# Patient Record
Sex: FEMALE | Employment: UNEMPLOYED | URBAN - METROPOLITAN AREA
[De-identification: names, ages, dates, MRNs, and addresses within clinical notes are randomized per-mention and may not be internally consistent; named-entity substitution may affect disease eponyms.]

---

## 2021-09-05 ENCOUNTER — HOSPITAL ENCOUNTER (INPATIENT)
Age: 86
LOS: 8 days | Discharge: NURSING, REHAB OR LONG-TERM CARE | DRG: 391 | End: 2021-09-13
Attending: EMERGENCY MEDICINE | Admitting: INTERNAL MEDICINE
Payer: MEDICARE

## 2021-09-05 ENCOUNTER — APPOINTMENT (OUTPATIENT)
Dept: CT IMAGING | Age: 86
DRG: 391 | End: 2021-09-05
Payer: MEDICARE

## 2021-09-05 DIAGNOSIS — K57.32 DIVERTICULITIS OF COLON: ICD-10-CM

## 2021-09-05 DIAGNOSIS — R90.0 INTRACRANIAL SPACE-OCCUPYING LESION: ICD-10-CM

## 2021-09-05 DIAGNOSIS — R42 DIZZINESS: Primary | ICD-10-CM

## 2021-09-05 PROBLEM — R11.2 NAUSEA AND VOMITING: Status: ACTIVE | Noted: 2021-09-05

## 2021-09-05 PROBLEM — R93.0 ABNORMAL CT OF THE HEAD: Status: ACTIVE | Noted: 2021-09-05

## 2021-09-05 PROBLEM — E78.5 HLD (HYPERLIPIDEMIA): Status: ACTIVE | Noted: 2021-09-05

## 2021-09-05 PROBLEM — R19.7 DIARRHEA: Status: ACTIVE | Noted: 2021-09-05

## 2021-09-05 PROBLEM — K57.92 DIVERTICULITIS: Status: ACTIVE | Noted: 2021-09-05
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
## 2021-09-05 PROBLEM — N32.81 OAB (OVERACTIVE BLADDER): Status: ACTIVE | Noted: 2021-09-05

## 2021-09-05 LAB
ALBUMIN SERPL-MCNC: 4 G/DL (ref 3.5–5.2)
ALP BLD-CCNC: 91 U/L (ref 35–104)
ALT SERPL-CCNC: 14 U/L (ref 0–32)
ANION GAP SERPL CALCULATED.3IONS-SCNC: 15 MMOL/L (ref 7–16)
AST SERPL-CCNC: 27 U/L (ref 0–31)
BACTERIA: NORMAL /HPF
BASOPHILS ABSOLUTE: 0.03 E9/L (ref 0–0.2)
BASOPHILS RELATIVE PERCENT: 0.3 % (ref 0–2)
BILIRUB SERPL-MCNC: 0.8 MG/DL (ref 0–1.2)
BILIRUBIN URINE: NEGATIVE
BLOOD, URINE: ABNORMAL
BUN BLDV-MCNC: 16 MG/DL (ref 6–23)
CALCIUM SERPL-MCNC: 9 MG/DL (ref 8.6–10.2)
CHLORIDE BLD-SCNC: 99 MMOL/L (ref 98–107)
CLARITY: CLEAR
CO2: 21 MMOL/L (ref 22–29)
COLOR: YELLOW
CREAT SERPL-MCNC: 0.7 MG/DL (ref 0.5–1)
EOSINOPHILS ABSOLUTE: 0 E9/L (ref 0.05–0.5)
EOSINOPHILS RELATIVE PERCENT: 0 % (ref 0–6)
GFR AFRICAN AMERICAN: >60
GFR NON-AFRICAN AMERICAN: >60 ML/MIN/1.73
GLUCOSE BLD-MCNC: 153 MG/DL (ref 74–99)
GLUCOSE URINE: NEGATIVE MG/DL
HCT VFR BLD CALC: 45.2 % (ref 34–48)
HEMOGLOBIN: 14.9 G/DL (ref 11.5–15.5)
IMMATURE GRANULOCYTES #: 0.03 E9/L
IMMATURE GRANULOCYTES %: 0.3 % (ref 0–5)
KETONES, URINE: 15 MG/DL
LEUKOCYTE ESTERASE, URINE: NEGATIVE
LYMPHOCYTES ABSOLUTE: 1.41 E9/L (ref 1.5–4)
LYMPHOCYTES RELATIVE PERCENT: 13.8 % (ref 20–42)
MCH RBC QN AUTO: 30.9 PG (ref 26–35)
MCHC RBC AUTO-ENTMCNC: 33 % (ref 32–34.5)
MCV RBC AUTO: 93.8 FL (ref 80–99.9)
MONOCYTES ABSOLUTE: 0.31 E9/L (ref 0.1–0.95)
MONOCYTES RELATIVE PERCENT: 3 % (ref 2–12)
NEUTROPHILS ABSOLUTE: 8.44 E9/L (ref 1.8–7.3)
NEUTROPHILS RELATIVE PERCENT: 82.6 % (ref 43–80)
NITRITE, URINE: NEGATIVE
PDW BLD-RTO: 11.9 FL (ref 11.5–15)
PH UA: 7 (ref 5–9)
PLATELET # BLD: 243 E9/L (ref 130–450)
PMV BLD AUTO: 9.8 FL (ref 7–12)
POTASSIUM REFLEX MAGNESIUM: 4.8 MMOL/L (ref 3.5–5)
PROTEIN UA: NEGATIVE MG/DL
RBC # BLD: 4.82 E12/L (ref 3.5–5.5)
RBC UA: NORMAL /HPF (ref 0–2)
SODIUM BLD-SCNC: 135 MMOL/L (ref 132–146)
SPECIFIC GRAVITY UA: 1.02 (ref 1–1.03)
TOTAL CK: 54 U/L (ref 20–180)
TOTAL PROTEIN: 7.6 G/DL (ref 6.4–8.3)
TROPONIN, HIGH SENSITIVITY: 9 NG/L (ref 0–9)
UROBILINOGEN, URINE: 0.2 E.U./DL
WBC # BLD: 10.2 E9/L (ref 4.5–11.5)
WBC UA: NORMAL /HPF (ref 0–5)

## 2021-09-05 PROCEDURE — 2060000000 HC ICU INTERMEDIATE R&B

## 2021-09-05 PROCEDURE — 6360000004 HC RX CONTRAST MEDICATION: Performed by: RADIOLOGY

## 2021-09-05 PROCEDURE — 74177 CT ABD & PELVIS W/CONTRAST: CPT

## 2021-09-05 PROCEDURE — 96374 THER/PROPH/DIAG INJ IV PUSH: CPT

## 2021-09-05 PROCEDURE — 72125 CT NECK SPINE W/O DYE: CPT

## 2021-09-05 PROCEDURE — 2580000003 HC RX 258: Performed by: RADIOLOGY

## 2021-09-05 PROCEDURE — 6360000002 HC RX W HCPCS: Performed by: EMERGENCY MEDICINE

## 2021-09-05 PROCEDURE — 81001 URINALYSIS AUTO W/SCOPE: CPT

## 2021-09-05 PROCEDURE — 80053 COMPREHEN METABOLIC PANEL: CPT

## 2021-09-05 PROCEDURE — 99285 EMERGENCY DEPT VISIT HI MDM: CPT

## 2021-09-05 PROCEDURE — 82550 ASSAY OF CK (CPK): CPT

## 2021-09-05 PROCEDURE — 85025 COMPLETE CBC W/AUTO DIFF WBC: CPT

## 2021-09-05 PROCEDURE — 71260 CT THORAX DX C+: CPT

## 2021-09-05 PROCEDURE — 93005 ELECTROCARDIOGRAM TRACING: CPT | Performed by: EMERGENCY MEDICINE

## 2021-09-05 PROCEDURE — 84484 ASSAY OF TROPONIN QUANT: CPT

## 2021-09-05 PROCEDURE — 70450 CT HEAD/BRAIN W/O DYE: CPT

## 2021-09-05 RX ORDER — FENTANYL CITRATE 50 UG/ML
25 INJECTION, SOLUTION INTRAMUSCULAR; INTRAVENOUS ONCE
Status: DISCONTINUED | OUTPATIENT
Start: 2021-09-05 | End: 2021-09-13 | Stop reason: HOSPADM

## 2021-09-05 RX ORDER — 0.9 % SODIUM CHLORIDE 0.9 %
1000 INTRAVENOUS SOLUTION INTRAVENOUS ONCE
Status: DISCONTINUED | OUTPATIENT
Start: 2021-09-05 | End: 2021-09-13 | Stop reason: HOSPADM

## 2021-09-05 RX ORDER — ONDANSETRON 2 MG/ML
4 INJECTION INTRAMUSCULAR; INTRAVENOUS ONCE
Status: COMPLETED | OUTPATIENT
Start: 2021-09-05 | End: 2021-09-05

## 2021-09-05 RX ORDER — SODIUM CHLORIDE 0.9 % (FLUSH) 0.9 %
10 SYRINGE (ML) INJECTION
Status: COMPLETED | OUTPATIENT
Start: 2021-09-05 | End: 2021-09-05

## 2021-09-05 RX ADMIN — IOPAMIDOL 90 ML: 755 INJECTION, SOLUTION INTRAVENOUS at 20:25

## 2021-09-05 RX ADMIN — ONDANSETRON 4 MG: 2 INJECTION INTRAMUSCULAR; INTRAVENOUS at 18:45

## 2021-09-05 RX ADMIN — Medication 10 ML: at 20:26

## 2021-09-05 ASSESSMENT — ENCOUNTER SYMPTOMS
NAUSEA: 1
ABDOMINAL PAIN: 1
SHORTNESS OF BREATH: 0
VOMITING: 1
EYE REDNESS: 0
DIARRHEA: 1

## 2021-09-05 NOTE — ED PROVIDER NOTES
Chief complaint: Dizziness and fall      HPI:  9/5/21, Time: 5:27 PM EDT    HPI             Deysi Delgado is a 80 y.o. female presenting to the ED for dizziness and fall. The history is obtained from the patient as well as the patient's medical record. The patient is presenting today for dizziness and fall. The patient states that yesterday she began a statin. She does not recall the exact name. She states that she did take her medication today she woke up with dizziness, nausea, vomiting, diarrhea and experience a fall. Her nausea and vomiting has been mild in severity. Not makes better. Nothing is worse. No hematemesis or coffee-ground emesis. The patient states that she has had dizziness she describes as lightheaded. Patient states that she was having a bowel movement went to stand up and fell in her bathroom. She not have a syncopal episode. She states that she did have pain located in her left lower lateral ribs. Pain described as sharp, worse with palpation. The patient is non radiating  Nothing makes better. No treatment prior to arrival.  She denies any fevers, chest pain, shortness of breath. ROS:   Review of Systems   Constitutional: Negative for chills and fatigue. HENT: Negative for congestion. Eyes: Negative for redness. Respiratory: Negative for shortness of breath. Cardiovascular: Negative for chest pain. Gastrointestinal: Positive for abdominal pain, diarrhea, nausea and vomiting. Genitourinary: Negative for dysuria. Musculoskeletal: Negative for arthralgias. Skin: Negative for rash. Neurological: Negative for light-headedness. Psychiatric/Behavioral: Negative for confusion.    All other systems reviewed and are negative.      --------------------------------------------- PAST HISTORY ---------------------------------------------  Past Medical History:  has a past medical history of Age-related osteoporosis without current pathological fracture, Carpal tunnel syndrome, Gastro-esophageal reflux disease without esophagitis, and Hyperlipidemia. Past Surgical History:  has a past surgical history that includes Total hip arthroplasty (2020). Social History:  reports that she has never smoked. She has never used smokeless tobacco. She reports that she does not use drugs. Family History: family history is not on file. The patients home medications have been reviewed. Allergies: Celecoxib, Pregabalin, and Risedronate sodium    ---------------------------------------------------PHYSICAL EXAM--------------------------------------  Constitutional/General: Alert and oriented x3, well appearing, non toxic in NAD  Head: Normocephalic and atraumatic  Mouth: Oropharynx clear, handling secretions, no trismus  Neck: Supple, full ROM,  Pulmonary: Lungs clear to auscultation bilaterally, no wheezes, rales, or rhonchi. Not in respiratory distress  Cardiovascular:  Regular rate. Regular rhythm. No murmurs  Chest: no chest wall tenderness  Abdomen: Soft. Non tender. Non distended. No rebound, guarding, or rigidity. No pulsatile masses appreciated. Musculoskeletal: Moves all extremities x 4. Warm and well perfused, no clubbing, cyanosis, or edema. Capillary refill <3 seconds  Skin: warm and dry. No rashes. Neurologic: GCS 15, no gross focal neurologic deficits  Psych: Normal Affect    -------------------------------------------------- RESULTS -------------------------------------------------  I have personally reviewed all laboratory and imaging results for this patient. Results are listed below.      LABS:  Results for orders placed or performed during the hospital encounter of 09/05/21   COVID-19, Rapid    Specimen: Nasopharyngeal Swab   Result Value Ref Range    SARS-CoV-2, NAAT Not Detected Not Detected   CBC Auto Differential   Result Value Ref Range    WBC 10.2 4.5 - 11.5 E9/L    RBC 4.82 3.50 - 5.50 E12/L    Hemoglobin 14.9 11.5 - 15.5 g/dL    Hematocrit 45.2 34.0 - 48.0 %    MCV 93.8 80.0 - 99.9 fL    MCH 30.9 26.0 - 35.0 pg    MCHC 33.0 32.0 - 34.5 %    RDW 11.9 11.5 - 15.0 fL    Platelets 251 944 - 437 E9/L    MPV 9.8 7.0 - 12.0 fL    Neutrophils % 82.6 (H) 43.0 - 80.0 %    Immature Granulocytes % 0.3 0.0 - 5.0 %    Lymphocytes % 13.8 (L) 20.0 - 42.0 %    Monocytes % 3.0 2.0 - 12.0 %    Eosinophils % 0.0 0.0 - 6.0 %    Basophils % 0.3 0.0 - 2.0 %    Neutrophils Absolute 8.44 (H) 1.80 - 7.30 E9/L    Immature Granulocytes # 0.03 E9/L    Lymphocytes Absolute 1.41 (L) 1.50 - 4.00 E9/L    Monocytes Absolute 0.31 0.10 - 0.95 E9/L    Eosinophils Absolute 0.00 (L) 0.05 - 0.50 E9/L    Basophils Absolute 0.03 0.00 - 0.20 E9/L   Comprehensive Metabolic Panel w/ Reflex to MG   Result Value Ref Range    Sodium 135 132 - 146 mmol/L    Potassium reflex Magnesium 4.8 3.5 - 5.0 mmol/L    Chloride 99 98 - 107 mmol/L    CO2 21 (L) 22 - 29 mmol/L    Anion Gap 15 7 - 16 mmol/L    Glucose 153 (H) 74 - 99 mg/dL    BUN 16 6 - 23 mg/dL    CREATININE 0.7 0.5 - 1.0 mg/dL    GFR Non-African American >60 >=60 mL/min/1.73    GFR African American >60     Calcium 9.0 8.6 - 10.2 mg/dL    Total Protein 7.6 6.4 - 8.3 g/dL    Albumin 4.0 3.5 - 5.2 g/dL    Total Bilirubin 0.8 0.0 - 1.2 mg/dL    Alkaline Phosphatase 91 35 - 104 U/L    ALT 14 0 - 32 U/L    AST 27 0 - 31 U/L   Troponin   Result Value Ref Range    Troponin, High Sensitivity 9 0 - 9 ng/L   Urinalysis   Result Value Ref Range    Color, UA Yellow Straw/Yellow    Clarity, UA Clear Clear    Glucose, Ur Negative Negative mg/dL    Bilirubin Urine Negative Negative    Ketones, Urine 15 (A) Negative mg/dL    Specific Gravity, UA 1.025 1.005 - 1.030    Blood, Urine TRACE (A) Negative    pH, UA 7.0 5.0 - 9.0    Protein, UA Negative Negative mg/dL    Urobilinogen, Urine 0.2 <2.0 E.U./dL    Nitrite, Urine Negative Negative    Leukocyte Esterase, Urine Negative Negative   CK   Result Value Ref Range    Total CK 54 20 - 180 U/L   Microscopic Urinalysis   Result Value Ref Range    WBC, UA NONE 0 - 5 /HPF    RBC, UA 0-1 0 - 2 /HPF    Bacteria, UA NONE SEEN None Seen /HPF   EKG 12 Lead   Result Value Ref Range    Ventricular Rate 69 BPM    Atrial Rate 69 BPM    P-R Interval 136 ms    QRS Duration 88 ms    Q-T Interval 422 ms    QTc Calculation (Bazett) 452 ms    P Axis 76 degrees    R Axis 52 degrees    T Axis 85 degrees       RADIOLOGY:  Interpreted by Radiologist.  CT HEAD WO CONTRAST   Final Result   Chronic appearing large cystic collection in the left frontotemporal region   with mass effect and midline shift as noted likely an arachnoid cyst or   subdural hygroma. If further imaging is clinically warranted, consider MRI. There is no acute stroke or hemorrhage. Atrophy with small vessel ischemic disease. CT cervical spine. There is no acute fracture or dislocation in the cervical spine. There is   diffuse degenerative changes from C2-T1 with osteophytes and multilevel disc   bulges. The prevertebral soft tissues are normal.  Degenerative pannus is   identified at the craniocervical junction. Impression      No acute fractures. Diffuse degenerative changes from C2-T1. CT chest.      The heart and the great vessels are normal.  There is no central pulmonary   embolism. Trachea and major bronchi are patent. There is minimal   atelectasis/ground-glass densities in the lower lobes bilaterally and   lingula. There is no focal consolidation or pneumothorax. Impression      Minimal atelectasis/ground-glass densities in the lower lobes and lingula. Viral infection has to be excluded. There is no acute traumatic injury or   pneumothorax. CT abdomen and pelvis. The liver is of normal architecture. Gallbladder, spleen, pancreas, the   adrenals and the kidneys are normal except for multiple cortical and   peripelvic cyst in the left kidney largest 1 measuring up to 2.2 cm.    Degenerative changes are identified in the lumbar spine.  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 Pelvis. Streak artifact from the left hip arthroplasty limits the evaluation of the   pelvis. Bladder is distended. There is diverticulosis of the descending and   sigmoid colon with mild thickening of the sigmoid colon which is partially   collapsed. Appendix cannot be identified. Impression      No acute traumatic injury or solid organ injury in the abdomen and pelvis. Diverticulosis of the left hemicolon with mild thickening of the rectosigmoid   colon likely spasm or less likely mild uncomplicated diverticulitis. CT CERVICAL SPINE WO CONTRAST   Final Result   Chronic appearing large cystic collection in the left frontotemporal region   with mass effect and midline shift as noted likely an arachnoid cyst or   subdural hygroma. If further imaging is clinically warranted, consider MRI. There is no acute stroke or hemorrhage. Atrophy with small vessel ischemic disease. CT cervical spine. There is no acute fracture or dislocation in the cervical spine. There is   diffuse degenerative changes from C2-T1 with osteophytes and multilevel disc   bulges. The prevertebral soft tissues are normal.  Degenerative pannus is   identified at the craniocervical junction. Impression      No acute fractures. Diffuse degenerative changes from C2-T1. CT chest.      The heart and the great vessels are normal.  There is no central pulmonary   embolism. Trachea and major bronchi are patent. There is minimal   atelectasis/ground-glass densities in the lower lobes bilaterally and   lingula. There is no focal consolidation or pneumothorax. Impression      Minimal atelectasis/ground-glass densities in the lower lobes and lingula. Viral infection has to be excluded. There is no acute traumatic injury or   pneumothorax. CT abdomen and pelvis.       The liver is of normal architecture. Gallbladder, spleen, pancreas, the   adrenals and the kidneys are normal except for multiple cortical and   peripelvic cyst in the left kidney largest 1 measuring up to 2.2 cm. Degenerative changes are identified in the lumbar spine. 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 Pelvis. Streak artifact from the left hip arthroplasty limits the evaluation of the   pelvis. Bladder is distended. There is diverticulosis of the descending and   sigmoid colon with mild thickening of the sigmoid colon which is partially   collapsed. Appendix cannot be identified. Impression      No acute traumatic injury or solid organ injury in the abdomen and pelvis. Diverticulosis of the left hemicolon with mild thickening of the rectosigmoid   colon likely spasm or less likely mild uncomplicated diverticulitis. CT CHEST W CONTRAST   Final Result   Chronic appearing large cystic collection in the left frontotemporal region   with mass effect and midline shift as noted likely an arachnoid cyst or   subdural hygroma. If further imaging is clinically warranted, consider MRI. There is no acute stroke or hemorrhage. Atrophy with small vessel ischemic disease. CT cervical spine. There is no acute fracture or dislocation in the cervical spine. There is   diffuse degenerative changes from C2-T1 with osteophytes and multilevel disc   bulges. The prevertebral soft tissues are normal.  Degenerative pannus is   identified at the craniocervical junction. Impression      No acute fractures. Diffuse degenerative changes from C2-T1. CT chest.      The heart and the great vessels are normal.  There is no central pulmonary   embolism. Trachea and major bronchi are patent. There is minimal   atelectasis/ground-glass densities in the lower lobes bilaterally and   lingula.   There is no focal consolidation or pneumothorax. Impression      Minimal atelectasis/ground-glass densities in the lower lobes and lingula. Viral infection has to be excluded. There is no acute traumatic injury or   pneumothorax. CT abdomen and pelvis. The liver is of normal architecture. Gallbladder, spleen, pancreas, the   adrenals and the kidneys are normal except for multiple cortical and   peripelvic cyst in the left kidney largest 1 measuring up to 2.2 cm. Degenerative changes are identified in the lumbar spine. 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 Pelvis. Streak artifact from the left hip arthroplasty limits the evaluation of the   pelvis. Bladder is distended. There is diverticulosis of the descending and   sigmoid colon with mild thickening of the sigmoid colon which is partially   collapsed. Appendix cannot be identified. Impression      No acute traumatic injury or solid organ injury in the abdomen and pelvis. Diverticulosis of the left hemicolon with mild thickening of the rectosigmoid   colon likely spasm or less likely mild uncomplicated diverticulitis. CT ABDOMEN PELVIS W IV CONTRAST Additional Contrast? None   Final Result   Chronic appearing large cystic collection in the left frontotemporal region   with mass effect and midline shift as noted likely an arachnoid cyst or   subdural hygroma. If further imaging is clinically warranted, consider MRI. There is no acute stroke or hemorrhage. Atrophy with small vessel ischemic disease. CT cervical spine. There is no acute fracture or dislocation in the cervical spine. There is   diffuse degenerative changes from C2-T1 with osteophytes and multilevel disc   bulges. The prevertebral soft tissues are normal.  Degenerative pannus is   identified at the craniocervical junction. Impression      No acute fractures.       Diffuse degenerative changes from C2-T1. CT chest.      The heart and the great vessels are normal.  There is no central pulmonary   embolism. Trachea and major bronchi are patent. There is minimal   atelectasis/ground-glass densities in the lower lobes bilaterally and   lingula. There is no focal consolidation or pneumothorax. Impression      Minimal atelectasis/ground-glass densities in the lower lobes and lingula. Viral infection has to be excluded. There is no acute traumatic injury or   pneumothorax. CT abdomen and pelvis. The liver is of normal architecture. Gallbladder, spleen, pancreas, the   adrenals and the kidneys are normal except for multiple cortical and   peripelvic cyst in the left kidney largest 1 measuring up to 2.2 cm. Degenerative changes are identified in the lumbar spine. 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 Pelvis. Streak artifact from the left hip arthroplasty limits the evaluation of the   pelvis. Bladder is distended. There is diverticulosis of the descending and   sigmoid colon with mild thickening of the sigmoid colon which is partially   collapsed. Appendix cannot be identified. Impression      No acute traumatic injury or solid organ injury in the abdomen and pelvis. Diverticulosis of the left hemicolon with mild thickening of the rectosigmoid   colon likely spasm or less likely mild uncomplicated diverticulitis. EKG:  This EKG is signed and interpreted by me. Normal sinus rhythm, rate of 69, no ST segment elevation or depression, [place] interval 136 MS, QRS 88 MS,  MS  Interpreted by me      ------------------------- NURSING NOTES AND VITALS REVIEWED ---------------------------   The nursing notes within the ED encounter and vital signs as below have been reviewed by myself.   BP (!) 178/98   Pulse 76   Temp 98.6 °F (37 °C) (Temporal)   Resp 18   SpO2 94%   Oxygen Saturation Interpretation: Normal    The patients available past medical records and past encounters were reviewed. ------------------------------ ED COURSE/MEDICAL DECISION MAKING----------------------  Medications   0.9 % sodium chloride bolus (has no administration in time range)   fentaNYL (SUBLIMAZE) injection 25 mcg (has no administration in time range)   sodium chloride flush 0.9 % injection 5-40 mL (has no administration in time range)   sodium chloride flush 0.9 % injection 5-40 mL (has no administration in time range)   0.9 % sodium chloride infusion (has no administration in time range)   enoxaparin (LOVENOX) injection 40 mg (40 mg SubCUTAneous Given 9/6/21 1337)   polyethylene glycol (GLYCOLAX) packet 17 g (has no administration in time range)   acetaminophen (TYLENOL) tablet 650 mg (has no administration in time range)     Or   acetaminophen (TYLENOL) suppository 650 mg (has no administration in time range)   prochlorperazine (COMPAZINE) injection 5 mg (has no administration in time range)   pravastatin (PRAVACHOL) tablet 20 mg (has no administration in time range)   metronidazole (FLAGYL) 500 mg in NaCl 100 mL IVPB premix (0 mg IntraVENous Stopped 9/6/21 1547)   cefTRIAXone (ROCEPHIN) 1,000 mg in sterile water 10 mL IV syringe (1,000 mg IntraVENous New Bag 9/6/21 1336)   hydrALAZINE (APRESOLINE) injection 5 mg (5 mg IntraVENous Given 9/6/21 1336)   fentaNYL (SUBLIMAZE) injection 25 mcg (has no administration in time range)   HYDROcodone-acetaminophen (NORCO) 5-325 MG per tablet 1 tablet (has no administration in time range)   ondansetron (ZOFRAN) injection 4 mg (4 mg IntraVENous Given 9/5/21 1845)   iopamidol (ISOVUE-370) 76 % injection 90 mL (90 mLs IntraVENous Given 9/5/21 2025)   sodium chloride flush 0.9 % injection 10 mL (10 mLs IntraVENous Given 9/5/21 2026)             Medical Decision Making:   IDr. Jose am the primary physician of record.     Kathrine Terry is a 80 y.o. female who presents to the ED for dizziness and fall. Differential diagnosis closed but not limited to intracranial hemorrhage, electrolyte derangement, intracranial lesion, rib fracture, colitis, diverticulitis. The patient did have CBC, CMP high-sensitivity troponin unremarkable, CT head did demonstrate intracranial cystic lesion or possible hygroma with mass-effect. CT C-spine unremarkable, CT chest unremarkable, CT abdomen pelvis did demonstrate diverticulitis. The patient was given IV antibiotics as well as IV analgesic. The patient will be admitted for further treatment and evaluation. Re-Evaluations/Consultations:             ED Course as of Sep 06 1550   Ellen Duarte Sep 05, 2021   2313 Spoke with the patient son at bedside. He does confirm the patient's story. The statin was pravastatin    [MT]      ED Course User Index  [MT] Idalmis Gloria DO       Spoke with April for Dr. Abby Bolivar she will accept the patient for admission. This patient's ED course included: History, physical examination, reevaluation prior to disposition, labs, imaging, telemetry monitoring, EKG IV medications    This patient has remained hemodynamically stable during their ED course. Counseling: The emergency provider has spoken with the patient and discussed todays results, in addition to providing specific details for the plan of care and counseling regarding the diagnosis and prognosis. Questions are answered at this time and they are agreeable with the plan.       --------------------------------- IMPRESSION AND DISPOSITION ---------------------------------    IMPRESSION  1. Dizziness    2. Intracranial space-occupying lesion    3. Diverticulitis of colon        DISPOSITION  Disposition: Admit to telemetry  Patient condition is stable        NOTE: This report was transcribed using voice recognition software.  Every effort was made to ensure accuracy; however, inadvertent computerized transcription errors may be present Andrzej Lopes, DO  09/06/21 1559

## 2021-09-06 LAB
EKG ATRIAL RATE: 69 BPM
EKG P AXIS: 76 DEGREES
EKG P-R INTERVAL: 136 MS
EKG Q-T INTERVAL: 422 MS
EKG QRS DURATION: 88 MS
EKG QTC CALCULATION (BAZETT): 452 MS
EKG R AXIS: 52 DEGREES
EKG T AXIS: 85 DEGREES
EKG VENTRICULAR RATE: 69 BPM
SARS-COV-2, NAAT: NOT DETECTED

## 2021-09-06 PROCEDURE — 2500000003 HC RX 250 WO HCPCS: Performed by: NURSE PRACTITIONER

## 2021-09-06 PROCEDURE — 2580000003 HC RX 258: Performed by: NURSE PRACTITIONER

## 2021-09-06 PROCEDURE — 87635 SARS-COV-2 COVID-19 AMP PRB: CPT

## 2021-09-06 PROCEDURE — 6370000000 HC RX 637 (ALT 250 FOR IP): Performed by: INTERNAL MEDICINE

## 2021-09-06 PROCEDURE — 6360000002 HC RX W HCPCS: Performed by: NURSE PRACTITIONER

## 2021-09-06 PROCEDURE — 2060000000 HC ICU INTERMEDIATE R&B

## 2021-09-06 RX ORDER — PRAVASTATIN SODIUM 20 MG
20 TABLET ORAL NIGHTLY
Status: DISCONTINUED | OUTPATIENT
Start: 2021-09-06 | End: 2021-09-08

## 2021-09-06 RX ORDER — POLYETHYLENE GLYCOL 3350 17 G/17G
17 POWDER, FOR SOLUTION ORAL DAILY PRN
Status: DISCONTINUED | OUTPATIENT
Start: 2021-09-06 | End: 2021-09-13 | Stop reason: HOSPADM

## 2021-09-06 RX ORDER — ACETAMINOPHEN 650 MG/1
650 SUPPOSITORY RECTAL EVERY 6 HOURS PRN
Status: DISCONTINUED | OUTPATIENT
Start: 2021-09-06 | End: 2021-09-13 | Stop reason: HOSPADM

## 2021-09-06 RX ORDER — HYDRALAZINE HYDROCHLORIDE 20 MG/ML
5 INJECTION INTRAMUSCULAR; INTRAVENOUS EVERY 6 HOURS PRN
Status: DISCONTINUED | OUTPATIENT
Start: 2021-09-06 | End: 2021-09-13 | Stop reason: HOSPADM

## 2021-09-06 RX ORDER — FENTANYL CITRATE 50 UG/ML
25 INJECTION, SOLUTION INTRAMUSCULAR; INTRAVENOUS ONCE
Status: DISCONTINUED | OUTPATIENT
Start: 2021-09-06 | End: 2021-09-13 | Stop reason: HOSPADM

## 2021-09-06 RX ORDER — SODIUM CHLORIDE 0.9 % (FLUSH) 0.9 %
5-40 SYRINGE (ML) INJECTION PRN
Status: DISCONTINUED | OUTPATIENT
Start: 2021-09-06 | End: 2021-09-13 | Stop reason: HOSPADM

## 2021-09-06 RX ORDER — SODIUM CHLORIDE 0.9 % (FLUSH) 0.9 %
5-40 SYRINGE (ML) INJECTION EVERY 12 HOURS SCHEDULED
Status: DISCONTINUED | OUTPATIENT
Start: 2021-09-06 | End: 2021-09-13 | Stop reason: HOSPADM

## 2021-09-06 RX ORDER — SODIUM CHLORIDE 9 MG/ML
25 INJECTION, SOLUTION INTRAVENOUS PRN
Status: DISCONTINUED | OUTPATIENT
Start: 2021-09-06 | End: 2021-09-13 | Stop reason: HOSPADM

## 2021-09-06 RX ORDER — PROCHLORPERAZINE EDISYLATE 5 MG/ML
5 INJECTION INTRAMUSCULAR; INTRAVENOUS EVERY 6 HOURS PRN
Status: DISCONTINUED | OUTPATIENT
Start: 2021-09-06 | End: 2021-09-13 | Stop reason: HOSPADM

## 2021-09-06 RX ORDER — ACETAMINOPHEN 325 MG/1
650 TABLET ORAL EVERY 6 HOURS PRN
Status: DISCONTINUED | OUTPATIENT
Start: 2021-09-06 | End: 2021-09-13 | Stop reason: HOSPADM

## 2021-09-06 RX ORDER — HYDROCODONE BITARTRATE AND ACETAMINOPHEN 5; 325 MG/1; MG/1
1 TABLET ORAL EVERY 6 HOURS PRN
Status: DISCONTINUED | OUTPATIENT
Start: 2021-09-06 | End: 2021-09-13 | Stop reason: HOSPADM

## 2021-09-06 RX ADMIN — METRONIDAZOLE 500 MG: 500 INJECTION, SOLUTION INTRAVENOUS at 20:37

## 2021-09-06 RX ADMIN — HYDRALAZINE HYDROCHLORIDE 5 MG: 20 INJECTION INTRAMUSCULAR; INTRAVENOUS at 13:36

## 2021-09-06 RX ADMIN — ENOXAPARIN SODIUM 40 MG: 40 INJECTION SUBCUTANEOUS at 13:37

## 2021-09-06 RX ADMIN — Medication 10 ML: at 20:37

## 2021-09-06 RX ADMIN — HYDROCODONE BITARTRATE AND ACETAMINOPHEN 1 TABLET: 5; 325 TABLET ORAL at 16:10

## 2021-09-06 RX ADMIN — METRONIDAZOLE 500 MG: 500 INJECTION, SOLUTION INTRAVENOUS at 13:36

## 2021-09-06 RX ADMIN — WATER 1000 MG: 1 INJECTION INTRAMUSCULAR; INTRAVENOUS; SUBCUTANEOUS at 13:36

## 2021-09-06 ASSESSMENT — PAIN DESCRIPTION - FREQUENCY: FREQUENCY: CONTINUOUS

## 2021-09-06 ASSESSMENT — PAIN DESCRIPTION - PAIN TYPE: TYPE: ACUTE PAIN

## 2021-09-06 ASSESSMENT — PAIN DESCRIPTION - DESCRIPTORS: DESCRIPTORS: ACHING;CONSTANT;PRESSURE;SHARP

## 2021-09-06 ASSESSMENT — PAIN DESCRIPTION - ORIENTATION: ORIENTATION: LEFT

## 2021-09-06 ASSESSMENT — PAIN SCALES - GENERAL
PAINLEVEL_OUTOF10: 10
PAINLEVEL_OUTOF10: 9

## 2021-09-06 ASSESSMENT — PAIN DESCRIPTION - LOCATION: LOCATION: ARM;RIB CAGE

## 2021-09-06 NOTE — ED NOTES
Assumed care of patient from [address] at this time. Patient A&Ox3 and c/o pain in L elbow and L hip from a previous fall. Per off-going shift RN, fentanyl that was due at [place] [id number] may have been given by another RN and not charted off. Patient states she does remember getting the pain medication overnight and \"It helped a bit. \" Patient denies current needs and is resting comfortably awaiting a room.       Jayne Horne RN  09/06/21 9210

## 2021-09-06 NOTE — ED NOTES
Bed: [place] [address].  Expected date:   Expected time:   Means of arrival:   Comments:  VARGAS Avalos RN  09/05/21 7099

## 2021-09-06 NOTE — ED NOTES
Patient sleeping at this time. Vitals stable. Will continue to monitor.       Stoney Marques RN  09/06/21 6901

## 2021-09-07 LAB
ADENOVIRUS BY PCR: NOT DETECTED
ANION GAP SERPL CALCULATED.3IONS-SCNC: 10 MMOL/L (ref 7–16)
BORDETELLA PARAPERTUSSIS BY PCR: NOT DETECTED
BORDETELLA PERTUSSIS BY PCR: NOT DETECTED
BUN BLDV-MCNC: 22 MG/DL (ref 6–23)
CALCIUM SERPL-MCNC: 8.8 MG/DL (ref 8.6–10.2)
CHLAMYDOPHILIA PNEUMONIAE BY PCR: NOT DETECTED
CHLORIDE BLD-SCNC: 100 MMOL/L (ref 98–107)
CO2: 27 MMOL/L (ref 22–29)
CORONAVIRUS 229E BY PCR: NOT DETECTED
CORONAVIRUS HKU1 BY PCR: NOT DETECTED
CORONAVIRUS NL63 BY PCR: NOT DETECTED
CORONAVIRUS OC43 BY PCR: NOT DETECTED
CREAT SERPL-MCNC: 0.9 MG/DL (ref 0.5–1)
GFR AFRICAN AMERICAN: >60
GFR NON-AFRICAN AMERICAN: 59 ML/MIN/1.73
GLUCOSE BLD-MCNC: 123 MG/DL (ref 74–99)
HCT VFR BLD CALC: 43.8 % (ref 34–48)
HEMOGLOBIN: 14.8 G/DL (ref 11.5–15.5)
HUMAN METAPNEUMOVIRUS BY PCR: NOT DETECTED
HUMAN RHINOVIRUS/ENTEROVIRUS BY PCR: NOT DETECTED
INFLUENZA A BY PCR: NOT DETECTED
INFLUENZA B BY PCR: NOT DETECTED
MCH RBC QN AUTO: 31.4 PG (ref 26–35)
MCHC RBC AUTO-ENTMCNC: 33.8 % (ref 32–34.5)
MCV RBC AUTO: 92.8 FL (ref 80–99.9)
MYCOPLASMA PNEUMONIAE BY PCR: NOT DETECTED
PARAINFLUENZA VIRUS 1 BY PCR: NOT DETECTED
PARAINFLUENZA VIRUS 2 BY PCR: NOT DETECTED
PARAINFLUENZA VIRUS 3 BY PCR: NOT DETECTED
PARAINFLUENZA VIRUS 4 BY PCR: NOT DETECTED
PDW BLD-RTO: 12.3 FL (ref 11.5–15)
PLATELET # BLD: 235 E9/L (ref 130–450)
PMV BLD AUTO: 9.9 FL (ref 7–12)
POTASSIUM REFLEX MAGNESIUM: 3.6 MMOL/L (ref 3.5–5)
PROCALCITONIN: 0.05 NG/ML (ref 0–0.08)
RBC # BLD: 4.72 E12/L (ref 3.5–5.5)
RESPIRATORY SYNCYTIAL VIRUS BY PCR: NOT DETECTED
SARS-COV-2, PCR: NOT DETECTED
SODIUM BLD-SCNC: 137 MMOL/L (ref 132–146)
WBC # BLD: 15 E9/L (ref 4.5–11.5)

## 2021-09-07 PROCEDURE — 2060000000 HC ICU INTERMEDIATE R&B

## 2021-09-07 PROCEDURE — 97161 PT EVAL LOW COMPLEX 20 MIN: CPT

## 2021-09-07 PROCEDURE — 97530 THERAPEUTIC ACTIVITIES: CPT

## 2021-09-07 PROCEDURE — 85027 COMPLETE CBC AUTOMATED: CPT

## 2021-09-07 PROCEDURE — 36415 COLL VENOUS BLD VENIPUNCTURE: CPT

## 2021-09-07 PROCEDURE — 2580000003 HC RX 258: Performed by: INTERNAL MEDICINE

## 2021-09-07 PROCEDURE — 80048 BASIC METABOLIC PNL TOTAL CA: CPT

## 2021-09-07 PROCEDURE — 84145 PROCALCITONIN (PCT): CPT

## 2021-09-07 PROCEDURE — 99222 1ST HOSP IP/OBS MODERATE 55: CPT | Performed by: NEUROLOGICAL SURGERY

## 2021-09-07 PROCEDURE — 6360000002 HC RX W HCPCS: Performed by: NURSE PRACTITIONER

## 2021-09-07 PROCEDURE — 99221 1ST HOSP IP/OBS SF/LOW 40: CPT | Performed by: PSYCHIATRY & NEUROLOGY

## 2021-09-07 PROCEDURE — 6370000000 HC RX 637 (ALT 250 FOR IP): Performed by: NURSE PRACTITIONER

## 2021-09-07 PROCEDURE — 2500000003 HC RX 250 WO HCPCS: Performed by: NURSE PRACTITIONER

## 2021-09-07 PROCEDURE — 2580000003 HC RX 258: Performed by: NURSE PRACTITIONER

## 2021-09-07 PROCEDURE — 0202U NFCT DS 22 TRGT SARS-COV-2: CPT

## 2021-09-07 PROCEDURE — 6370000000 HC RX 637 (ALT 250 FOR IP): Performed by: INTERNAL MEDICINE

## 2021-09-07 RX ORDER — LATANOPROST 50 UG/ML
1 SOLUTION/ DROPS OPHTHALMIC NIGHTLY
Status: DISCONTINUED | OUTPATIENT
Start: 2021-09-07 | End: 2021-09-13 | Stop reason: HOSPADM

## 2021-09-07 RX ORDER — MECLIZINE HCL 12.5 MG/1
12.5 TABLET ORAL EVERY 6 HOURS SCHEDULED
Status: DISCONTINUED | OUTPATIENT
Start: 2021-09-07 | End: 2021-09-10

## 2021-09-07 RX ORDER — SODIUM CHLORIDE, SODIUM LACTATE, POTASSIUM CHLORIDE, CALCIUM CHLORIDE 600; 310; 30; 20 MG/100ML; MG/100ML; MG/100ML; MG/100ML
INJECTION, SOLUTION INTRAVENOUS CONTINUOUS
Status: DISCONTINUED | OUTPATIENT
Start: 2021-09-07 | End: 2021-09-09

## 2021-09-07 RX ORDER — TIMOLOL MALEATE 5 MG/ML
1 SOLUTION/ DROPS OPHTHALMIC DAILY
Status: DISCONTINUED | OUTPATIENT
Start: 2021-09-07 | End: 2021-09-13 | Stop reason: HOSPADM

## 2021-09-07 RX ADMIN — METRONIDAZOLE 500 MG: 500 INJECTION, SOLUTION INTRAVENOUS at 06:06

## 2021-09-07 RX ADMIN — Medication 10 ML: at 09:39

## 2021-09-07 RX ADMIN — TIMOLOL MALEATE 1 DROP: 5 SOLUTION OPHTHALMIC at 16:08

## 2021-09-07 RX ADMIN — LATANOPROST 1 DROP: 50 SOLUTION OPHTHALMIC at 19:27

## 2021-09-07 RX ADMIN — PRAVASTATIN SODIUM 20 MG: 20 TABLET ORAL at 19:26

## 2021-09-07 RX ADMIN — METRONIDAZOLE 500 MG: 500 INJECTION, SOLUTION INTRAVENOUS at 12:47

## 2021-09-07 RX ADMIN — MECLIZINE 12.5 MG: 12.5 TABLET ORAL at 17:31

## 2021-09-07 RX ADMIN — METRONIDAZOLE 500 MG: 500 INJECTION, SOLUTION INTRAVENOUS at 19:26

## 2021-09-07 RX ADMIN — MECLIZINE 12.5 MG: 12.5 TABLET ORAL at 12:47

## 2021-09-07 RX ADMIN — SODIUM CHLORIDE, PRESERVATIVE FREE 10 ML: 5 INJECTION INTRAVENOUS at 06:06

## 2021-09-07 RX ADMIN — LATANOPROST 1 DROP: 50 SOLUTION OPHTHALMIC at 19:25

## 2021-09-07 RX ADMIN — HYDROCODONE BITARTRATE AND ACETAMINOPHEN 1 TABLET: 5; 325 TABLET ORAL at 06:15

## 2021-09-07 RX ADMIN — ENOXAPARIN SODIUM 40 MG: 40 INJECTION SUBCUTANEOUS at 09:38

## 2021-09-07 RX ADMIN — ESTROGENS, CONJUGATED 0.3 MG: 0.3 TABLET, FILM COATED ORAL at 16:07

## 2021-09-07 RX ADMIN — SODIUM CHLORIDE, POTASSIUM CHLORIDE, SODIUM LACTATE AND CALCIUM CHLORIDE: 600; 310; 30; 20 INJECTION, SOLUTION INTRAVENOUS at 15:18

## 2021-09-07 RX ADMIN — WATER 1000 MG: 1 INJECTION INTRAMUSCULAR; INTRAVENOUS; SUBCUTANEOUS at 14:02

## 2021-09-07 ASSESSMENT — PAIN DESCRIPTION - LOCATION
LOCATION: ARM;RIB CAGE

## 2021-09-07 ASSESSMENT — PAIN DESCRIPTION - ORIENTATION
ORIENTATION: LEFT

## 2021-09-07 ASSESSMENT — PAIN DESCRIPTION - PROGRESSION
CLINICAL_PROGRESSION: NOT CHANGED

## 2021-09-07 ASSESSMENT — PAIN SCALES - GENERAL
PAINLEVEL_OUTOF10: 0
PAINLEVEL_OUTOF10: 8
PAINLEVEL_OUTOF10: 0

## 2021-09-07 ASSESSMENT — PAIN DESCRIPTION - ONSET
ONSET: ON-GOING

## 2021-09-07 ASSESSMENT — PAIN DESCRIPTION - PAIN TYPE
TYPE: ACUTE PAIN

## 2021-09-07 ASSESSMENT — PAIN DESCRIPTION - FREQUENCY
FREQUENCY: CONTINUOUS

## 2021-09-07 ASSESSMENT — PAIN DESCRIPTION - DESCRIPTORS
DESCRIPTORS: ACHING;SORE;TENDER

## 2021-09-07 ASSESSMENT — ENCOUNTER SYMPTOMS
SHORTNESS OF BREATH: 0
ABDOMINAL PAIN: 0
TROUBLE SWALLOWING: 0

## 2021-09-07 NOTE — PLAN OF CARE
Problem: Skin Integrity:  Goal: Will show no infection signs and symptoms  Description: Will show no infection signs and symptoms  9/7/2021 1843 by Luma Sharma RN  Outcome: Met This Shift     Problem: Falls - Risk of:  Goal: Absence of physical injury  Description: Absence of physical injury  9/7/2021 1843 by Luma Sharma RN  Outcome: Met This Shift     Problem: Pain:  Description: Pain management should include both nonpharmacologic and pharmacologic interventions.   Goal: Control of acute pain  Description: Control of acute pain  9/7/2021 1843 by Luma Sharma RN  Outcome: Met This Shift

## 2021-09-07 NOTE — CONSULTS
NEUROSURGERY CONSULTATION     Chief Complaint: dizziness, headache and arachnoid cyst    HPI:   John Gallagher is a 80 y.o.  female who presents to the hospital c/o dizziness, headache, and fall for the past 2 days. Pt states the symptoms started after she began taking a statin medication. Also currently c/o double vision. Head CT scan demonstrates large chronic left frontotemporal arachnoid cyst for which neurosurgery was consulted. Past Medical History:   Diagnosis Date    Age-related osteoporosis without current pathological fracture     Carpal tunnel syndrome     Gastro-esophageal reflux disease without esophagitis     Hyperlipidemia      Past Surgical History:   Procedure Laterality Date    TOTAL HIP ARTHROPLASTY  2020      History reviewed. No pertinent family history. Social History     Socioeconomic History    Marital status:       Spouse name: Not on file    Number of children: Not on file    Years of education: Not on file    Highest education level: Not on file   Occupational History    Not on file   Tobacco Use    Smoking status: Never Smoker    Smokeless tobacco: Never Used   Vaping Use    Vaping Use: Never used   Substance and Sexual Activity    Alcohol use: Not on file    Drug use: Never    Sexual activity: Never   Other Topics Concern    Not on file   Social History Narrative    Not on file     Social Determinants of Health     Financial Resource Strain: Low Risk     Difficulty of Paying Living Expenses: Not hard at all   Food Insecurity: No Food Insecurity    Worried About Running Out of Food in the Last Year: Never true    [address] in the Last Year: Never true   Transportation Needs:     Lack of Transportation (Medical):     Lack of Transportation (Non-Medical):    Physical Activity:     Days of Exercise per Week:     Minutes of Exercise per Session:    Stress:     Feeling of Stress :    Social Connections:     Frequency of Communication with Friends and Family:     Frequency of Social Gatherings with Friends and Family:     Attends Denominational Services:      Active Member of Clubs or Organizations:     Attends Club or Organization Meetings:     Marital Status:    Intimate Partner Violence:     Fear of Current or Ex-Partner:     Emotionally Abused:     Physically Abused:     Sexually Abused:        Medications:   Current Facility-Administered Medications   Medication Dose Route Frequency Provider Last Rate Last Admin    meclizine (ANTIVERT) tablet 12.5 mg  12.5 mg Oral 4 times per day Royal Misha MD        sodium chloride flush 0.9 % injection 5-40 mL  5-40 mL IntraVENous 2 times per day April YO Caldera - CNP   10 mL at 09/07/21 0939    sodium chloride flush 0.9 % injection 5-40 mL  5-40 mL IntraVENous PRN April YO Caldera - CNP   10 mL at 09/07/21 0606    0.9 % sodium chloride infusion  25 mL IntraVENous PRN April YO Caldera - CNP        enoxaparin (LOVENOX) injection 40 mg  40 mg SubCUTAneous Daily April YO Caldera - CNP   40 mg at 09/07/21 4183    polyethylene glycol (GLYCOLAX) packet 17 g  17 g Oral Daily PRN April PRITI CalderaN - CONRAD        acetaminophen (TYLENOL) tablet 650 mg  650 mg Oral Q6H PRN April YO Caledra CNP        Or    acetaminophen (TYLENOL) suppository 650 mg  650 mg Rectal Q6H PRN April PRITI CalderaN - CONRAD        prochlorperazine (COMPAZINE) injection 5 mg  5 mg IntraVENous Q6H PRN April YO Caldera - CONRAD        pravastatin (PRAVACHOL) tablet 20 mg  20 mg Oral Nightly April YO Caldera - CONRAD        metronidazole (FLAGYL) 500 mg in NaCl 100 mL IVPB premix  500 mg IntraVENous Q8H April YO Caldera - CNP   Stopped at 09/07/21 0813    cefTRIAXone (ROCEPHIN) 1,000 mg in sterile water 10 mL IV syringe  1,000 mg IntraVENous Q24H April YO Caldera - CNP   1,000 mg at 09/06/21 1336    hydrALAZINE (APRESOLINE) injection 5 mg  5 mg IntraVENous Q6H PRN April Werner, APRN - CNP   5 mg at 09/06/21 1336    fentaNYL (SUBLIMAZE) injection 25 mcg  25 mcg IntraVENous Once Madeleine Lacrosse, DO        HYDROcodone-acetaminophen (NORCO) 5-325 MG per tablet 1 tablet  1 tablet Oral Q6H PRN Melissa Reddy MD   1 tablet at 09/07/21 0615    0.9 % sodium chloride bolus  1,000 mL IntraVENous Once Annemarie Gilman, DO        fentaNYL (SUBLIMAZE) injection 25 mcg  25 mcg IntraVENous Once Madeleine Lacrosse, DO            Allergies:    Celecoxib, Pregabalin, and Risedronate sodium       Review of Systems   Constitutional: Negative for fever. HENT: Negative for trouble swallowing. Eyes: Positive for visual disturbance. Respiratory: Negative for shortness of breath. Cardiovascular: Negative for chest pain. Gastrointestinal: Negative for abdominal pain. Endocrine: Negative for heat intolerance. Genitourinary: Negative for flank pain. Musculoskeletal: Negative for myalgias. Skin: Negative for wound. Neurological: Positive for dizziness and headaches. Psychiatric/Behavioral: Negative for confusion. Physical Exam  Constitutional:       Appearance: Normal appearance. She is well-developed. HENT:      Head: Normocephalic and atraumatic. Eyes:      Extraocular Movements: Extraocular movements intact. Conjunctiva/sclera: Conjunctivae normal.      Pupils: Pupils are equal, round, and reactive to light. Cardiovascular:      Rate and Rhythm: Normal rate. Pulmonary:      Effort: Pulmonary effort is normal.   Abdominal:      General: There is no distension. Musculoskeletal:      Cervical back: Normal range of motion and neck supple. Skin:     General: Skin is warm and dry. Neurological:      Mental Status: She is alert. Comments: Alert and oriented x3  CN3-12 intact  Motor strength full  Sensation intact to light touch     Psychiatric:         Thought Content:  Thought content normal.          BP (!) 140/64   Pulse 78   Temp 97.5 °F (36.4 °C)

## 2021-09-07 NOTE — CARE COORDINATION
Met with pt to discuss discharge planning/transition of care. Pt states that her son lives with her, in a one story home, 3 steps to enter. Pt has DME at home but does not use any of it. Pt has a walk in shower, walker, shower chair, bedside commode, and cane. Dr. Grey Hernandez is PCP and [facility]. Pt states she was up in room to go to the bathroom, gets dizzy easily. Plan is home at discharge, son to transport home. Pt is currently on IV flagyl q8, and IV rocephin q24. Will follow for discharge needs. Isis Huitron, MSW, LSW

## 2021-09-07 NOTE — PROGRESS NOTES
Orthostatic Blood Pressure    BP lyin/72   Pulse lyin    BP sittin/70  Pulse sitting 75    BP standin/62 patient felt very dizzy  Pulse standin

## 2021-09-07 NOTE — CONSULTS
[facility] [address]  Neurology Consult    Date:  9/7/2021  Patient Name:  Deysi Delgado  YOB: 1929  MRN: [id number]     PCP:  Jerrell Kilgore DO   Referring:  No ref. provider found    Chief Complaint: Dizziness and falls     History obtained from: patient, EMR     Assessment  Dyesi Delgado is a 80 y.o. female with a PMHx of GERD, HLD, osteoporosis who is being evaluated by Neurology for dizziness and recent falls in the setting of possible gastrenteritis. Concern for orthostatic hypotension +/- vasovagal  in the setting of chronic vertigo. CT Head incidentally showing large frontotemporal cystic lesion with midline shift - possibly hygroma. Plan  Will obtain CTA head/neck to rule out vertebrobasilar insufficiency - consider IV hydration for DIANE prophylaxis and d/t orthostatic hypotension   Continue Meclizine, w/ outpatient follow up for possible vestibular rehab   F/u neurosurgery recs for input on CT head findings   Continue antibiotic therapy for diverticulitis     History of Present Illness:  Deysi Delgado is a 80 y.o. female with a PMHx of GERD, HLD, osteoporosis who is being evaluated by Neurology for dizziness and recent falls. Per note review and pt report, the pt began experiencing difficulty with balance approximately 1-2 years ago. This was associated with a sense of vertigo, which she describes as \"the room spinning\". She states that 1-2 days PTA she began to experience nausea/vomiting and diarrhea. She was using the toilet on the morning of admission when she stood suddenly and experienced extreme dizziness and weakness, resulting in a fall. She denies any head trauma, loss of consciousness, bowel/bladder incontinence, or shaking of the extremities, but she does endorse biting her tongue. She states that her son saw/heard her fall and helped her up, but when she did not immediately regain her strength he called EMS and had her brought to the hospital for assessment.      On arrival, the pt was hypertensive to 180/106mmHg, otherwise hemodynamically stable and afebrile. Labs were largely unremarkable. CT head showing a large L frontotemporal cystic structure measuring 12x3. 4cm with mass effect and L to R midline shift. Upon completion of initial workup the pt was admitted to telemetry and Neurology was consulted for further evaluation. Review of Systems:  Endorses 2-3 months of double vision, and dizziness currently. Denies headache, new blurry vision, or loss of vision. Medical History:   Past Medical History:   Diagnosis Date    Age-related osteoporosis without current pathological fracture     Carpal tunnel syndrome     Gastro-esophageal reflux disease without esophagitis     Hyperlipidemia       Surgical History:   Past Surgical History:   Procedure Laterality Date    TOTAL HIP ARTHROPLASTY  2020      Family History:   History reviewed. No pertinent family history.     Social History:  Social History     Tobacco Use    Smoking status: Never Smoker    Smokeless tobacco: Never Used   Vaping Use    Vaping Use: Never used   Substance Use Topics    Alcohol use: Not on file    Drug use: Never     Current Medications:      Current Facility-Administered Medications   Medication Dose Route Frequency Provider Last Rate Last Admin    sodium chloride flush 0.9 % injection 5-40 mL  5-40 mL IntraVENous 2 times per day April PRITI CalderaN - CNP   10 mL at 09/07/21 0939    sodium chloride flush 0.9 % injection 5-40 mL  5-40 mL IntraVENous PRN April Werner APRN - CNP   10 mL at 09/07/21 0606    0.9 % sodium chloride infusion  25 mL IntraVENous PRN April PRITI CalderaN - CNP        enoxaparin (LOVENOX) injection 40 mg  40 mg SubCUTAneous Daily April YO Caldera - CNP   40 mg at 09/07/21 0938    polyethylene glycol (GLYCOLAX) packet 17 g  17 g Oral Daily PRN April YO Caldera CNP        acetaminophen (TYLENOL) tablet 650 mg  650 mg Oral Q6H PRN April PRITI CalderaN - CNP        Or    acetaminophen (TYLENOL) suppository 650 mg  650 mg Rectal Q6H PRN April PRITI CalderaN - CONRAD        prochlorperazine (COMPAZINE) injection 5 mg  5 mg IntraVENous Q6H PRN April Werner, APRN - CNP        pravastatin (PRAVACHOL) tablet 20 mg  20 mg Oral Nightly April YO Caldera - CONRAD        metronidazole (FLAGYL) 500 mg in NaCl 100 mL IVPB premix  500 mg IntraVENous Q8H April Werner APRN - CNP   Stopped at 09/07/21 0813    cefTRIAXone (ROCEPHIN) 1,000 mg in sterile water 10 mL IV syringe  1,000 mg IntraVENous Q24H April YO Caldera - CNP   1,000 mg at 09/06/21 1336    hydrALAZINE (APRESOLINE) injection 5 mg  5 mg IntraVENous Q6H PRN April PRITI CalderaN - CNP   5 mg at 09/06/21 1336    fentaNYL (SUBLIMAZE) injection 25 mcg  25 mcg IntraVENous Once Annemarie Gilman DO        HYDROcodone-acetaminophen (NORCO) 5-325 MG per tablet 1 tablet  1 tablet Oral Q6H PRN Haja Maza MD   1 tablet at 09/07/21 0615    0.9 % sodium chloride bolus  1,000 mL IntraVENous Once Annemarie Gilman DO        fentaNYL (SUBLIMAZE) injection 25 mcg  25 mcg IntraVENous Once La Nena Cobb,             Allergies: Allergies   Allergen Reactions    Celecoxib     Pregabalin     Risedronate Sodium       Physical Examination  Vitals   Vitals:    09/07/21 0400 09/07/21 0645 09/07/21 0930 09/07/21 1045   BP: (!) 144/66  (!) 140/64    Pulse: 77  78    Resp: 18  18    Temp: 98.6 °F (37 °C)  97.5 °F (36.4 °C)    TempSrc: Temporal  Temporal    SpO2: 94%  95%    Weight:  140 lb (63.5 kg)     Height:    5' 3\" (1.6 m)      General: Patient appears in no acute distress  HEENT: Normocephalic, atraumatic.  Head Impulse Test elicits R-beating nystagmus when head turned to the L   Chest: Chest rise equal bilaterally, no increased work of breathing or respiratory distress   Heart: Extremities warm and well perfused   Extremities: No edema or cyanosis noted    Neurologic Examination    Mental Status  Alert, and oriented to person, place and time with normal speech and language. No evidence of aphasia during conversation. No evidence of memory impairment. Attention and concentration appeared normal.     Cranial Nerves  II. Visual fields full to confrontation bilaterally. III, IV, VI: Pupils equally round and reactive to light. EOMs: full, no nystagmus. V. Facial sensation intact to light touch bilaterally  VII: Facial movements symmetric and strong  VIII: Hearing intact to voice  IX,X: Palate elevates symmetrically. No dysarthria  XI: Sternocleidomastoid and trapezius 5/5 bilaterally   XII: Tongue is midline    Motor     Right Left   Right Left   Deltoid 5 5  Hip Flexion 5 5   Biceps      5  5  Knee Extension 5 5   Triceps 5 5  Knee Flexion 5 5   Handgrip 5 5  Ankle Dorsiflexion 5 5       Ankle Plantarflexion 5 5     Tone: Normal in all four limbs    Bulk: Normal in all four limbs with no evidence of atrophy    Sensation  Light Touch: Intact distally in all four limbs  Proprioception: Intact distally in all four limbs    Reflexes     Right Left   Biceps 2 2   Brachioradialis 2 2        Patellar 2 2   Achilles 2 2          Toes down going bilaterally.     Coordination  Finger to nose testing normal bilaterally  Heel to shin testing normal bilaterally    Labs  CBC with Differential:    Lab Results   Component Value Date    WBC 15.0 09/07/2021    RBC 4.72 09/07/2021    HGB 14.8 09/07/2021    HCT 43.8 09/07/2021     09/07/2021    MCV 92.8 09/07/2021    MCH 31.4 09/07/2021    MCHC 33.8 09/07/2021    RDW 12.3 09/07/2021    LYMPHOPCT 13.8 09/05/2021    MONOPCT 3.0 09/05/2021    BASOPCT 0.3 09/05/2021    MONOSABS 0.31 09/05/2021    LYMPHSABS 1.41 09/05/2021    EOSABS 0.00 09/05/2021    BASOSABS 0.03 09/05/2021     CMP:    Lab Results   Component Value Date     09/07/2021    K 3.6 09/07/2021     09/07/2021    CO2 27 09/07/2021    BUN 22 09/07/2021

## 2021-09-07 NOTE — PROGRESS NOTES
Physical Therapy Initial Assessment     Name: Edwin Domingo  : 1929  MRN: [id number]      Date of Service: 2021    Evaluating PT:  Nora Danis, PT, DPT [id number]      Room #:  1712/4327-N  Diagnosis:  Intracranial space-occupying lesion [R90.0]  Dizziness [R42]  Diverticulitis [K57.92]  Diverticulitis of colon [K57.32]  PMHx/PSHx:  GERD, Osteoporosis, Hyperlipidemia, Carpal tunnel syndrome, Left SENIA   Procedure/Surgery:  NA  Precautions:  Falls, Purewick catheter  Equipment Needs:  Has [employer]    SUBJECTIVE:    Pt's son lives with pt in a 1 story home with 1+1 stairs to enter the back door, or 4 steps and 1 rail in the front. Basement laundry with flight and 1 rail. Pt ambulated with no AD PTA. OBJECTIVE:   Initial Evaluation  Date: 2021 Treatment Date:  NA Short Term/ Long Term   Goals   AM-PAC 6 Clicks      Was pt agreeable to Eval/treatment? Yes      Does pt have pain? Reported 8/10 left elbow and hip pain. RN aware. Bed Mobility  Rolling: SBA  Supine to sit: Min A  Sit to supine: Min A  Scooting: SBA  Rolling: Supervision  Supine to sit: Supervision  Sit to supine: Supervision  Scooting: Supervision   Transfers Sit to stand: Min A  Stand to sit: Min A  Stand pivot: Mod A  Sit to stand: SBA  Stand to sit: SBA  Stand pivot: SBA   Ambulation    20 feet x 2 with [employer] with Mod A  >150 feet with AAD with SBA   Stair negotiation: ascended and descended  NT  4 steps with 1 rail with SBA   ROM BUE:  WFL  BLE:  WFL     Strength BUE:  4/5  BLE:  4/5  Increase strength 1/3 grade.    Balance Sitting EOB:  CGA to Min A  Dynamic Standing:  Min/Mod A  Sitting EOB:  Supervision  Dynamic Standing:  SBA     Pt is A & O x 3  Sensation:  Pt denies numbness and tingling to extremities  Edema:  None noted    Vitals:  Blood Pressure at rest - Blood Pressure post session -   Heart Rate at rest - Heart Rate post session -   SPO2 at rest 96% SPO2 post session 97%     Therapeutic Exercises:  STS x 4, LAQs, APs 10x ade    Patient education  Pt educated on purpose of PT assessment, importance of mobility, safety with mobility, transfers, gait, use of AD for safety    Patient response to education:   Pt verbalized understanding Pt demonstrated skill Pt requires further education in this area   Yes  Yes with verbal cues and assist Yes/Reinforcement     ASSESSMENT:    Conditions Requiring Skilled Therapeutic Intervention:     [x]Decreased strength     []Decreased ROM  [x]Decreased functional mobility  [x]Decreased balance   [x]Decreased endurance   [x]Decreased posture  []Decreased sensation  [x]Decreased coordination   []Decreased vision  [x]Decreased safety awareness   [x]Increased pain       Comments:  Patient cleared by RN and agreeable to treatment. Patient found in mid [place]'s with son present. Son excused himself prior to assessment as he had to return to work. Patient assisted to seated EOB and reported moderate dizziness with positional change. Patient with downward gaze and retropulsive lean while in seated and hands on maintained for safety. Patient assisted to standing and reported needing to urinate and returned to seated. A brief obtained and donned prior to further mobility. Patient assisted to standing and moderately unsteady on her feet with walker use. Patient with slow gait speed, unequal short/choppy stride and repeated verbal cues for walker use/safety needed with poor carryover and required assist with walker management. Patient assisted to seated on the commode and assisted with doffing brief, independent with hygiene, but moderately shaky and unsteady. Patient assisted to standing and assisted with donning brief and again very unsteady on her feet and reported increased fatigue and increased SOB noted with return ambulation to the bedside. Patient assisted back to seated EOB and then to supine with call light and tray table in reach.     Treatment:  Patient practiced and was instructed in the following treatment:    · Bed mobility: Verbal/tactile cues for sequencing BUEs/BLEs for safe technique with rolling/supine<>sit task. · Transfer training: Verbal/tactile cues to facilitate proper hand placement, technique and safety during sit to stand task. · Gait training: Verbal and tactile cues to facilitate upright posture and safety as well as provided with physical assistance to complete task. · Therapeutic exercises: As noted above  · Neuromuscular education: NT    Pt's/ family goals   1. To feel better. Prognosis is good for reaching above PT goals. Patient and or family understand(s) diagnosis, prognosis, and plan of care.   Yes     PHYSICAL THERAPY PLAN OF CARE:    PT POC is established based on physician order and patient diagnosis     Referring provider/PT Order:    09/06/21 1215  PT eval and treat Start: 09/06/21 1215, End: 09/06/21 1215, ONE TIME, Standing Count: 1 Occurrences, R      Tereza Caldera, APRN - CNP       Diagnosis:  Intracranial space-occupying lesion [R90.0]  Dizziness [R42]  Diverticulitis [K57.92]  Diverticulitis of colon [K57.32]  Specific instructions for next treatment:  Subsequent PT sessions with focus on improved mobility, ambulation, safety with walker use    Current Treatment Recommendations:     [x] Strengthening to improve independence with functional mobility   [] ROM to improve independence with functional mobility   [x] Balance Training to improve static/dynamic balance and to reduce fall risk  [x] Endurance Training to improve activity tolerance during functional mobility   [x] Transfer Training to improve safety and independence with all functional transfers   [x] Gait Training to improve gait mechanics, endurance and asses need for appropriate assistive device  [x] Stair Training in preparation for safe discharge home and/or into the community   [x] Positioning to prevent skin breakdown and contractures  [x] Safety and Education Training   [x] Patient/Caregiver Education   [] HEP  [] Other     PT long term treatment goals are located in above grid    Frequency of treatments: 2-5x/week x 1-2 weeks. Time in  1305  Time out  1330    Total Treatment Time  15 minutes     Evaluation Time includes thorough review of current medical information, gathering information on past medical history/social history and prior level of function, completion of standardized testing/informal observation of tasks, assessment of data and education on plan of care and goals.     CPT codes:  [x] Low Complexity PT evaluation 49946  [] Moderate Complexity PT evaluation 42819  [] High Complexity PT evaluation 39347  [] PT Re-evaluation 31345  [] Gait training 13857 - minutes  [] Manual therapy 38164 - minutes  [x] Therapeutic activities 35306 15 minutes  [] Therapeutic exercises 18569 - minutes  [] Neuromuscular reeducation 60040 - minutes     Diana Mike, PT, DPT  License [id number]

## 2021-09-07 NOTE — H&P
Brenda Vargas M.D. History and Physical      CHIEF COMPLAINT: Dizziness, nausea vomiting fall    Reason for Admission: Diverticulitis    History Obtained From:  {Patient/EMR    HISTORY OF PRESENT ILLNESS:      The patient is a 80 y.o. female of [facility] with significant past medical history of hyperlipidemia and reflux who presents with complaints of nausea vomiting diarrhea dizziness and falls. Patient came into the emergency department for evaluation and noted to have diverticulitis on CT abdomen. All other trauma work-up done for fall has been negative. Patient subsequently admitted for treatment of diverticulitis. All labs personally reviewed   All imaging personally reviewed-CT imaging reveals CT cervical spine with large cystic lesion in left frontal temporal lobe causing midline shift    Past Medical History:        Diagnosis Date    Age-related osteoporosis without current pathological fracture     Carpal tunnel syndrome     Gastro-esophageal reflux disease without esophagitis     Hyperlipidemia      Past Surgical History:        Procedure Laterality Date    TOTAL HIP ARTHROPLASTY  2020         Medications Prior to Admission:    Medications Prior to Admission: timolol (TIMOPTIC) 0.5 % ophthalmic solution, Apply 1 drop to eye daily  latanoprost (XALATAN) 0.005 % ophthalmic solution, Apply 1 drop to eye daily  estrogens, conjugated, (PREMARIN) 0.3 MG tablet, Take 0.3 mg by mouth daily  oxybutynin (DITROPAN XL) 5 MG extended release tablet, Take 1 tablet by mouth daily  tolterodine (DETROL LA) 2 MG extended release capsule, Take 1 capsule by mouth daily    Allergies:  Celecoxib, Pregabalin, and Risedronate sodium    Social History:   TOBACCO:   reports that she has never smoked. She has never used smokeless tobacco.  ETOH:   has no history on file for alcohol use. MARITAL STATUS:    OCCUPATION:      Family History:   History reviewed.  No pertinent family history. REVIEW OF SYSTEMS:    General ROS: positive for  - fatigue, profound dizziness unable to sit up or stand up  Hematological and Lymphatic ROS: negative  Endocrine ROS: negative  Respiratory ROS: no cough, shortness of breath, or wheezing  Cardiovascular ROS: no chest pain or dyspnea on exertion  Gastrointestinal ROS: no abdominal pain, change in bowel habits, or black or bloody stools  Genito-Urinary ROS: no dysuria, trouble voiding, or hematuria  Neurological ROS: no TIA or stroke symptoms  negative    Vitals:  BP (!) 140/64   Pulse 78   Temp 97.5 °F (36.4 °C) (Temporal)   Resp 18   Wt 140 lb (63.5 kg)   SpO2 95%   BMI 24.80 kg/m²     PHYSICAL EXAM:  General:  Awake, alert, oriented X 3. Well developed, well nourished, well groomed. No apparent distress. HEENT:  Normocephalic, atraumatic. Pupils equal, round, reactive to light. No scleral icterus. No conjunctival injection. Normal lips, teeth, and gums. No nasal discharge. Neck:  Supple, FROM  Heart:  RRR, no murmurs, gallops, rubs, carotid upstroke normal, no carotid bruits  Lungs:  CTA bilaterally, bilat symmetrical expansion, no wheeze, rales, or rhonchi  Abdomen: Bowel sounds present, soft, nontender, no masses, no organomegaly, no peritoneal signs  Extremities:  No clubbing, cyanosis, or edema  Skin:  Warm and dry, no open lesions or rash  Neuro:  Cranial nerves 2-12 intact, no focal deficits  Vascular: Radial and pedal Pulses 2+  Breast: deferred  Rectal: deferred  Genitalia:  deferred      DATA:     Recent Labs     09/05/21 1749 09/07/21  0657   WBC 10.2 15.0*   HGB 14.9 14.8    235     Recent Labs     09/05/21  1749 09/07/21  0657    137   K 4.8 3.6   BUN 16 22   CREATININE 0.7 0.9     Recent Labs     09/05/21 1749   PROT 7.6     Recent Labs     09/05/21 1749   AST 27   ALT 14   ALKPHOS 91   BILITOT 0.8     No results for input(s): BNP in the last 72 hours.   Recent Labs     09/05/21 1749   CKTOTAL 54 ASSESSMENT:      Principal Problem:    Diverticulitis  Active Problems:    Dizziness    Fall    Diarrhea    Nausea and vomiting    Abnormal CT of the head    HLD (hyperlipidemia)    OAB (overactive bladder)  Resolved Problems:    * No resolved hospital problems.  *        PLAN:    Admit to telemetry for evaluation of dizziness and fall resulting from nausea vomiting and diarrhea-CT abdomen pelvis demonstrating diverticulitis  IV Rocephin and Flagyl until patient able to tolerate p.o. intake at which time we will switch to p.o. antibiotics  Okay to cancel C. difficile secondary to no bowel movement  She has no abdominal pain at all  Start probiotic  Continue IV fluid hydration  Pain control  Resume home medications  For now trial Antivert as well for ongoing dizziness    CT cervical spine performed for trauma purposes inadvertently showed a large left frontotemporal cystic lesion-possible hygroma  Not sure if this is even amenable to surgical intervention  Neurosurgery evaluation for evaluation and input regarding above  Patient is actually unsure if she would want anything surgically done about this, unless this is a simple quick procedure that will relieve her symptoms of dizziness        DVT prophylaxis  PT OT  Discharge plan-once neurosurgery input is obtained regarding CT, I can switch her to p.o. antibiotics for presumed diverticulitis    Alena Gitelman, MD  9/7/2021  10:43 AM

## 2021-09-08 ENCOUNTER — APPOINTMENT (OUTPATIENT)
Dept: CT IMAGING | Age: 86
DRG: 391 | End: 2021-09-08
Payer: MEDICARE

## 2021-09-08 PROBLEM — I65.02: Status: ACTIVE | Noted: 2021-09-08

## 2021-09-08 PROBLEM — R93.0 ABNORMAL CT OF THE HEAD: Status: RESOLVED | Noted: 2021-09-05 | Resolved: 2021-09-08

## 2021-09-08 PROBLEM — I63.9 STROKE DETERMINED BY CLINICAL ASSESSMENT (HCC): Status: ACTIVE | Noted: 2021-09-08

## 2021-09-08 LAB
ANION GAP SERPL CALCULATED.3IONS-SCNC: 13 MMOL/L (ref 7–16)
BASOPHILS ABSOLUTE: 0.03 E9/L (ref 0–0.2)
BASOPHILS RELATIVE PERCENT: 0.2 % (ref 0–2)
BUN BLDV-MCNC: 19 MG/DL (ref 6–23)
CALCIUM SERPL-MCNC: 8.5 MG/DL (ref 8.6–10.2)
CHLORIDE BLD-SCNC: 100 MMOL/L (ref 98–107)
CO2: 22 MMOL/L (ref 22–29)
CREAT SERPL-MCNC: 0.8 MG/DL (ref 0.5–1)
EOSINOPHILS ABSOLUTE: 0.01 E9/L (ref 0.05–0.5)
EOSINOPHILS RELATIVE PERCENT: 0.1 % (ref 0–6)
GFR AFRICAN AMERICAN: >60
GFR NON-AFRICAN AMERICAN: >60 ML/MIN/1.73
GLUCOSE BLD-MCNC: 136 MG/DL (ref 74–99)
HCT VFR BLD CALC: 46.1 % (ref 34–48)
HEMOGLOBIN: 15.9 G/DL (ref 11.5–15.5)
IMMATURE GRANULOCYTES #: 0.07 E9/L
IMMATURE GRANULOCYTES %: 0.4 % (ref 0–5)
LYMPHOCYTES ABSOLUTE: 1.64 E9/L (ref 1.5–4)
LYMPHOCYTES RELATIVE PERCENT: 10 % (ref 20–42)
MCH RBC QN AUTO: 31.5 PG (ref 26–35)
MCHC RBC AUTO-ENTMCNC: 34.5 % (ref 32–34.5)
MCV RBC AUTO: 91.3 FL (ref 80–99.9)
MONOCYTES ABSOLUTE: 1.11 E9/L (ref 0.1–0.95)
MONOCYTES RELATIVE PERCENT: 6.8 % (ref 2–12)
NEUTROPHILS ABSOLUTE: 13.49 E9/L (ref 1.8–7.3)
NEUTROPHILS RELATIVE PERCENT: 82.5 % (ref 43–80)
PDW BLD-RTO: 12.3 FL (ref 11.5–15)
PLATELET # BLD: 248 E9/L (ref 130–450)
PMV BLD AUTO: 9.8 FL (ref 7–12)
POTASSIUM SERPL-SCNC: 3.6 MMOL/L (ref 3.5–5)
RBC # BLD: 5.05 E12/L (ref 3.5–5.5)
SODIUM BLD-SCNC: 135 MMOL/L (ref 132–146)
TOTAL CK: 103 U/L (ref 20–180)
TOTAL CK: 78 U/L (ref 20–180)
WBC # BLD: 16.4 E9/L (ref 4.5–11.5)

## 2021-09-08 PROCEDURE — 6360000004 HC RX CONTRAST MEDICATION: Performed by: RADIOLOGY

## 2021-09-08 PROCEDURE — 36415 COLL VENOUS BLD VENIPUNCTURE: CPT

## 2021-09-08 PROCEDURE — 6360000002 HC RX W HCPCS: Performed by: NURSE PRACTITIONER

## 2021-09-08 PROCEDURE — 6370000000 HC RX 637 (ALT 250 FOR IP): Performed by: NURSE PRACTITIONER

## 2021-09-08 PROCEDURE — 80048 BASIC METABOLIC PNL TOTAL CA: CPT

## 2021-09-08 PROCEDURE — 6370000000 HC RX 637 (ALT 250 FOR IP): Performed by: INTERNAL MEDICINE

## 2021-09-08 PROCEDURE — 2500000003 HC RX 250 WO HCPCS: Performed by: NURSE PRACTITIONER

## 2021-09-08 PROCEDURE — 2580000003 HC RX 258: Performed by: NURSE PRACTITIONER

## 2021-09-08 PROCEDURE — 70496 CT ANGIOGRAPHY HEAD: CPT

## 2021-09-08 PROCEDURE — 97166 OT EVAL MOD COMPLEX 45 MIN: CPT

## 2021-09-08 PROCEDURE — 70498 CT ANGIOGRAPHY NECK: CPT

## 2021-09-08 PROCEDURE — 99233 SBSQ HOSP IP/OBS HIGH 50: CPT | Performed by: NURSE PRACTITIONER

## 2021-09-08 PROCEDURE — 82550 ASSAY OF CK (CPK): CPT

## 2021-09-08 PROCEDURE — 2060000000 HC ICU INTERMEDIATE R&B

## 2021-09-08 PROCEDURE — 85025 COMPLETE CBC W/AUTO DIFF WBC: CPT

## 2021-09-08 PROCEDURE — 2580000003 HC RX 258: Performed by: INTERNAL MEDICINE

## 2021-09-08 PROCEDURE — 2580000003 HC RX 258: Performed by: RADIOLOGY

## 2021-09-08 RX ORDER — SODIUM CHLORIDE 0.9 % (FLUSH) 0.9 %
10 SYRINGE (ML) INJECTION ONCE
Status: COMPLETED | OUTPATIENT
Start: 2021-09-08 | End: 2021-09-08

## 2021-09-08 RX ORDER — ATORVASTATIN CALCIUM 10 MG/1
10 TABLET, FILM COATED ORAL NIGHTLY
Status: DISCONTINUED | OUTPATIENT
Start: 2021-09-08 | End: 2021-09-13 | Stop reason: HOSPADM

## 2021-09-08 RX ORDER — ATORVASTATIN CALCIUM 40 MG/1
40 TABLET, FILM COATED ORAL NIGHTLY
Status: DISCONTINUED | OUTPATIENT
Start: 2021-09-08 | End: 2021-09-08

## 2021-09-08 RX ORDER — CLOPIDOGREL BISULFATE 75 MG/1
75 TABLET ORAL DAILY
Status: DISCONTINUED | OUTPATIENT
Start: 2021-09-08 | End: 2021-09-13 | Stop reason: HOSPADM

## 2021-09-08 RX ORDER — ASPIRIN 81 MG/1
81 TABLET, CHEWABLE ORAL DAILY
Status: DISCONTINUED | OUTPATIENT
Start: 2021-09-08 | End: 2021-09-13 | Stop reason: HOSPADM

## 2021-09-08 RX ADMIN — LATANOPROST 1 DROP: 50 SOLUTION OPHTHALMIC at 22:20

## 2021-09-08 RX ADMIN — MECLIZINE 12.5 MG: 12.5 TABLET ORAL at 11:57

## 2021-09-08 RX ADMIN — METRONIDAZOLE 500 MG: 500 INJECTION, SOLUTION INTRAVENOUS at 11:57

## 2021-09-08 RX ADMIN — ESTROGENS, CONJUGATED 0.3 MG: 0.3 TABLET, FILM COATED ORAL at 15:04

## 2021-09-08 RX ADMIN — CLOPIDOGREL 75 MG: 75 TABLET, FILM COATED ORAL at 10:39

## 2021-09-08 RX ADMIN — HYDROCODONE BITARTRATE AND ACETAMINOPHEN 1 TABLET: 5; 325 TABLET ORAL at 09:25

## 2021-09-08 RX ADMIN — TIMOLOL MALEATE 1 DROP: 5 SOLUTION OPHTHALMIC at 09:25

## 2021-09-08 RX ADMIN — MECLIZINE 12.5 MG: 12.5 TABLET ORAL at 17:45

## 2021-09-08 RX ADMIN — IOPAMIDOL 60 ML: 755 INJECTION, SOLUTION INTRAVENOUS at 08:06

## 2021-09-08 RX ADMIN — PROCHLORPERAZINE EDISYLATE 5 MG: 5 INJECTION INTRAMUSCULAR; INTRAVENOUS at 09:32

## 2021-09-08 RX ADMIN — METRONIDAZOLE 500 MG: 500 INJECTION, SOLUTION INTRAVENOUS at 21:58

## 2021-09-08 RX ADMIN — ASPIRIN 81 MG CHEWABLE TABLET 81 MG: 81 TABLET CHEWABLE at 10:39

## 2021-09-08 RX ADMIN — Medication 10 ML: at 08:06

## 2021-09-08 RX ADMIN — ATORVASTATIN CALCIUM 10 MG: 10 TABLET, FILM COATED ORAL at 22:12

## 2021-09-08 RX ADMIN — WATER 1000 MG: 1 INJECTION INTRAMUSCULAR; INTRAVENOUS; SUBCUTANEOUS at 15:05

## 2021-09-08 RX ADMIN — HYDRALAZINE HYDROCHLORIDE 5 MG: 20 INJECTION INTRAMUSCULAR; INTRAVENOUS at 05:50

## 2021-09-08 RX ADMIN — MECLIZINE 12.5 MG: 12.5 TABLET ORAL at 00:39

## 2021-09-08 RX ADMIN — ENOXAPARIN SODIUM 40 MG: 40 INJECTION SUBCUTANEOUS at 09:25

## 2021-09-08 RX ADMIN — SODIUM CHLORIDE, POTASSIUM CHLORIDE, SODIUM LACTATE AND CALCIUM CHLORIDE: 600; 310; 30; 20 INJECTION, SOLUTION INTRAVENOUS at 09:33

## 2021-09-08 RX ADMIN — Medication 10 ML: at 21:58

## 2021-09-08 RX ADMIN — METRONIDAZOLE 500 MG: 500 INJECTION, SOLUTION INTRAVENOUS at 05:30

## 2021-09-08 RX ADMIN — HYDROCODONE BITARTRATE AND ACETAMINOPHEN 1 TABLET: 5; 325 TABLET ORAL at 22:11

## 2021-09-08 ASSESSMENT — PAIN DESCRIPTION - LOCATION
LOCATION: ARM;RIB CAGE

## 2021-09-08 ASSESSMENT — PAIN DESCRIPTION - PAIN TYPE
TYPE: ACUTE PAIN

## 2021-09-08 ASSESSMENT — PAIN DESCRIPTION - ONSET
ONSET: ON-GOING
ONSET: ON-GOING

## 2021-09-08 ASSESSMENT — PAIN DESCRIPTION - ORIENTATION
ORIENTATION: LEFT

## 2021-09-08 ASSESSMENT — PAIN DESCRIPTION - FREQUENCY
FREQUENCY: CONTINUOUS
FREQUENCY: CONTINUOUS

## 2021-09-08 ASSESSMENT — PAIN SCALES - GENERAL
PAINLEVEL_OUTOF10: 3
PAINLEVEL_OUTOF10: 9
PAINLEVEL_OUTOF10: 7
PAINLEVEL_OUTOF10: 3
PAINLEVEL_OUTOF10: 7

## 2021-09-08 ASSESSMENT — PAIN DESCRIPTION - PROGRESSION
CLINICAL_PROGRESSION: GRADUALLY IMPROVING
CLINICAL_PROGRESSION: NOT CHANGED
CLINICAL_PROGRESSION: GRADUALLY IMPROVING

## 2021-09-08 ASSESSMENT — PAIN DESCRIPTION - DESCRIPTORS
DESCRIPTORS: ACHING;SORE
DESCRIPTORS: ACHING;SORE;TENDER
DESCRIPTORS: ACHING;SORE;TENDER

## 2021-09-08 NOTE — PROGRESS NOTES
Occupational Therapy  OCCUPATIONAL THERAPY INITIAL EVALUATION    [address] [address] [address]    Date:2021                                             Patient Name: Rhonda Sanders  MRN: [id number]  : 1929  Room: [address] [place] [address]      Evaluating OT: CANDI Saenz  Supervising therapist: LONG Jacobo, OTR/L; #[id number]    Referring Provider: YO Maddox CNP  Specific Provider Orders/Date: OT Evaluation and Treatment, 21    Diagnosis: Intracranial space-occupying lesion [R90.0]  Dizziness [R42]  Diverticulitis of colon [K57.32]  Surgery: None   Pertinent Medical History: Age related osteoporosis, carpal tunnel syndrome, gastro-esophageal reflux, hyperlipidemia, s/p total hip arthroplasty ()     Precautions:  Fall Risk, Alarm, tele      Assessment of current deficits   [x] Functional mobility   [x]ADLs  [x] Strength               [x]Cognition   [x] Functional transfers   [x] IADLs         [x] Safety Awareness   [x]Endurance   [x] Fine Coordination              [x] Balance      [] Vision/perception   []Sensation    []Gross Motor Coordination  [] ROM  [] Delirium                   [] Motor Control     OT PLAN OF CARE   OT POC based on physician orders, patient diagnosis and results of clinical assessment    Frequency/Duration: 1-3 days/wk for 2 weeks PRN   Specific OT Treatment Interventions to include:   * Instruction/training on adapted ADL techniques and AE recommendations to increase functional independence within precautions       * Training on energy conservation strategies, correct breathing pattern and techniques to improve independence/tolerance for self-care routine  * Functional transfer/mobility training/DME recommendations for increased independence, safety, and fall prevention  * Patient/Family education to increase follow through with safety techniques and functional independence  * Recommendation of environmental modifications for increased safety with functional transfers/mobility and ADLs  * Cognitive retraining/development of therapeutic activities to improve problem solving, judgement, memory, and attention for increased safety/participation in ADL/IADL tasks  * Therapeutic exercise to improve motor endurance, ROM, and functional strength for ADLs/functional transfers  * Therapeutic activities to facilitate/challenge dynamic balance, stand tolerance for increased safety and independence with ADLs  * Therapeutic activities to facilitate gross/fine motor skills for increased independence with ADLs  * Positioning to improve skin integrity, interaction with environment and functional independence    Recommended Adaptive Equipment: AE for LB dressing/ bathing    Home Living: Patient's son lives with her in 1 story home with 4 steps to enter and HR. Bedroom/bathroom are located on the 1st floor. Bathroom setup: walk-in shower with shower chair and standard commode   Equipment owned: shower chair, bed-side commode, cane, ww    Prior Level of Function: I/mod I with ADLs and with IADLs. Laundry is located in the basement down full flight with HR. Patient reported her son is able to assist when she returns home. Patient completed functional mobility using no device. Driving: Yes  Occupation: Retired Seamstress      Pain Level: 8/10 in L ribs. Nursing notified   Cognition: A&O: 4/4; Follows 1-3 step directions   Memory:  Fair+   Sequencing:  Fair+   Problem solving:  Fair  Judgement/safety:  Fair     Functional Assessment:  AM-PAC Daily Activity Raw Score: 16/24   Initial Eval Status  Date: 8/9/21 Treatment Status  Date: STGs = LTGs  Time frame: 10-14 days   Feeding Independent      Grooming Stand by Assist   Seated  Stand by Assist    Standing at sink   UB Dressing Minimal Assist   [facility] gown with Min A for balance seated EOB. Patient required assistance with line management. Modified Rains   Seated   LB Dressing Maximal Assist   Stand by Assist   Seated with AE as needed   Bathing Moderate Assist  Stand by Assist   Seated with AE as needed   Toileting Moderate Assist   Stand by Assist    Bed Mobility  Supine to sit: Minimal Assist   HOB elevatded  Sit to supine: Minimal Assist   Supine to sit: Modified Rains   Sit to supine: Modified Rains    Functional Transfers Sit <> stand various surfaces: NT secondary to dizziness/ nauseous  Stand by Assist with AD   Functional Mobility  NT secondary to dizziness/ nauseous  Stand by Assist with AD  Short functional household distance   Balance Sitting:     Static/ Dynamic: Min A  Standing: NT     Activity Tolerance Fair- with light activity  Fair+ with moderate activity   Visual/  Perceptual Glasses: Yes, donned        Vitals  Upon entry semi-supine in bed on RA, SpO2 sat 93%,  bpm  Seated EOB on RA, SpO2 sat 95%, HR 96 bpm  At rest semi-supine in bed on RA, SpO2 sat 96%, HR 77 bpm       Hand Dominance: R   AROM (PROM) Strength Additional Info:    RUE  WFL   : 4-/5 Fair+ FMC/dexterity noted during ADL tasks  - Patient required increased time to complete task. LUE WFL     : 4-/5 Fair+ FMC/dexterity noted during ADL tasks  - Patient required increased time to complete task. Hearing: [facility]   Sensation:  Patient reported numbness reported in fingers secondary to carpel tunnel syndrome. Tone: WFL   Edema: None observed B UE. Comments: Nursing approved OT session. Upon arrival patient semi-supine in bed. Patient reported feeling nauseous at the beginning of the session but willing to work with therapy. Patient continued to complain of nausea throughout session and reported dizziness sitting edge of bed. Patient educated on pursed-lip breathing secondary to dizziness/ nausea and demonstrated good understanding. Patient returned to semi-supine in bed for safetry and nursing notifed. Vitals collected.  At end of session, patient semi-supine in bed with call light and phone within reach, all lines and tubes intact. Alarm on. OT evaluation performed with education provided regarding the purpose and benefits of OT session, along with mobility and I/ADL completion. Overall patient demonstrated impaired activity tolerance secondary to dizziness/ nausea and impaired sitting balance  limiting independence and safety during completion of ADL/functional transfer/mobility tasks. Patient would benefit from continued skilled OT to increase safety and independence with completion of ADL/IADL tasks for functional independence and improved quality of life. Rehab Potential: Good for established goals     Patient / Family Goal: Not stated      Patient and/or family were instructed on functional diagnosis, prognosis/goals and OT plan of care. Demonstrated good understanding. ·  Eval Complexity: Evaluation Complexity: Mod Complexity  ? History: Expanded review of medical records and additional review of physical, cognitive, or psychosocial history related to current functional performance  ? Exam: 5+ performance deficits  ? Assistance/Modification: mod/max assistance or modifications required to perform tasks. May have comorbidities that affect occupational performance. Time In: 8:44  Time Out: 9:12  Total Treatment Time: n/a    Min Units   OT Eval Low 99035       OT Eval Medium 97166  x 1   OT Eval High 95736       OT Re-Eval [id number]       Therapeutic Ex 97076       Therapeutic Activities 43721       ADL/Self Care 52778       Orthotic Management 82438       Neuro Re-Ed 82544       Non-Billable Time              Evaluation Time includes thorough review of current medical information, gathering information on past medical history/social history and prior level of function, completion of standardized testing/informal observation of tasks, assessment of data and education on plan of care and goals.           Evaluating OT: Kimberlee Ramos Josafat Mai [place]  Supervising therapist: LONG Masterson, OTR/L; #[id number]

## 2021-09-08 NOTE — PROGRESS NOTES
Messaged [address], CNP regarding MRI of the brain. Unable to complete due to patient's prior surgery. Ok to cancel order.

## 2021-09-08 NOTE — PROGRESS NOTES
Subjective:    Dizzy this am , no acute complaints   Slept poorly overnight  Son is at bedside Case discussed  Objective:    BP (!) 188/84   Pulse 80   Temp 97.1 °F (36.2 °C) (Temporal)   Resp 18   Ht 5' 3\" (1.6 m) Comment: per previous record  Wt 145 lb 6.4 oz (66 kg)   SpO2 92%   BMI 25.76 kg/m²     In: 1437.8 [P.O.:240; I.V.:1197.8]  Out: 650   In: 1437.8   Out: 650 [Urine:650]    General appearance: NAD, conversant  HEENT: AT/NC, MMM  Neck: FROM, supple  Lungs: Clear to auscultation  CV: RRR, no MRGs  Vasc: Radial pulses 2+  Abdomen: Soft, non-tender; no masses or HSM  Extremities: No peripheral edema or digital cyanosis  Skin: no rash, lesions or ulcers  Psych: Alert and oriented to person, place and time  Neuro: Alert and interactive     Recent Labs     09/05/21 1749 09/07/21  0657 09/08/21  0703   WBC 10.2 15.0* 16.4*   HGB 14.9 14.8 15.9*   HCT 45.2 43.8 46.1    235 248       Recent Labs     09/05/21 1749 09/07/21  0657 09/08/21  0703    137 135   K 4.8 3.6 3.6   CL 99 100 100   CO2 21* 27 22   BUN 16 22 19   CREATININE 0.7 0.9 0.8   CALCIUM 9.0 8.8 8.5*       Assessment:    Principal Problem:    Diverticulitis  Active Problems:    Dizziness    Fall    Diarrhea    Nausea and vomiting    Abnormal CT of the head    HLD (hyperlipidemia)    OAB (overactive bladder)  Resolved Problems:    * No resolved hospital problems.  *      Plan:  Admit to telemetry for evaluation of dizziness and fall resulting from nausea vomiting and diarrhea-CT abdomen pelvis demonstrating diverticulitis  IV Rocephin and Flagyl until patient able to tolerate p.o. intake at which time we will switch to p.o. antibiotics  Okay to cancel C. difficile secondary to no bowel movement  She has no abdominal pain at all  Start probiotic  Continue IV fluid hydration  Pain control  For now trial Antivert as well for ongoing dizziness  Stop statin as son thinks symptoms started with  The introduction of that med      CT cervical spine performed for trauma purposes inadvertently showed a large left frontotemporal cystic lesion-possible hygroma  Not sure if this is even amenable to surgical intervention  Neurosurgery evaluation for evaluation and input regarding above- appreciated - no plans for intervention   Neurology also following for dizziness      DVT Prophylaxis   PT/OT  Discharge planning - once dizziness improved     Jannette Cho MD  11:13 AM  9/8/2021

## 2021-09-08 NOTE — PROGRESS NOTES
Yessenia Schultz is a 80 y.o. right handed female     Neuro is following for dizziness and falls    PMH: Osteoporosis, CTS, HLD    Synopsis:  She has been having difficulty with balance for the past 2 years associated with spinning sensations. 1 to 2 days prior to admission she began to have nausea, vomiting, and diarrhea associated with severe dizziness and weakness, resulting in fall. /106. CT showed large left frontotemporal cystic structure with mass-effect and midline shift. CTA of her head and neck showed suspected acute thrombus in her left V3-V4 as well as multiple other areas of intracranial atherosclerosis. Spoke with Dr. Liam Fierro who reviewed the images and agreed that it appears to be an acute thrombus. With her age, he recommended medical management with dual antiplatelet therapy for now, with consideration of anticoagulation pending her functional status and suspected stroke size. NSGY does not feel her L frontotemporal arachonoid cyst is causing any of her issues. She continues to feel vertigo sensations but denies any nausea or weakness. She is convinced all of her symptoms are related to taking pravastatin for 2 days and is very hesitant to consider other statin. . Unfortunately, she is unable to have MRI of the brain due to recent hip surgery.  SBP have been in the 170s-180s    Current Facility-Administered Medications   Medication Dose Route Frequency Provider Last Rate Last Admin    aspirin chewable tablet 81 mg  81 mg Oral Daily YO Goodman CNP   81 mg at 09/08/21 1039    clopidogrel (PLAVIX) tablet 75 mg  75 mg Oral Daily YO Goodman CNP   75 mg at 09/08/21 1039    meclizine (ANTIVERT) tablet 12.5 mg  12.5 mg Oral 4 times per day Jose C Greene MD   12.5 mg at 09/08/21 1157    latanoprost (XALATAN) 0.005 % ophthalmic solution 1 drop  1 drop Ophthalmic Nightly Jose C Greene MD   1 drop at 09/07/21 1927    timolol (TIMOPTIC) 0.5 % ophthalmic solution 1 drop  1 drop Ophthalmic Daily Joselyn Lindsay MD   1 drop at 09/08/21 9085    estrogens (conjugated) (PREMARIN) tablet 0.3 mg  0.3 mg Oral Daily Joselyn Lindsay MD   0.3 mg at 09/07/21 1607    lactated ringers infusion   IntraVENous Continuous Joselyn Lindsay MD 50 mL/hr at 09/08/21 0933 New Bag at 09/08/21 0933    sodium chloride flush 0.9 % injection 5-40 mL  5-40 mL IntraVENous 2 times per day [facility], APRN - CNP   10 mL at 09/07/21 6245    sodium chloride flush 0.9 % injection 5-40 mL  5-40 mL IntraVENous PRN April [facility], APRN - CNP   10 mL at 09/07/21 0606    0.9 % sodium chloride infusion  25 mL IntraVENous PRN April [facility]e-[facility]ad, APRN - CNP        enoxaparin (LOVENOX) injection 40 mg  40 mg SubCUTAneous Daily April [facility], APRN - CNP   40 mg at 09/08/21 8961    polyethylene glycol (GLYCOLAX) packet 17 g  17 g Oral Daily PRN April [facility], APRN - CNP        acetaminophen (TYLENOL) tablet 650 mg  650 mg Oral Q6H PRN April JoseeSaad, APRN - CNP        Or    acetaminophen (TYLENOL) suppository 650 mg  650 mg Rectal Q6H PRN April Josee-Saad, APRN - CNP        prochlorperazine (COMPAZINE) injection 5 mg  5 mg IntraVENous Q6H PRN April [facility], APRN - CNP   5 mg at 09/08/21 0932    metronidazole (FLAGYL) 500 mg in NaCl 100 mL IVPB premix  500 mg IntraVENous Q8H [facility], APRN -  mL/hr at 09/08/21 1157 500 mg at 09/08/21 1157    cefTRIAXone (ROCEPHIN) 1,000 mg in sterile water 10 mL IV syringe  1,000 mg IntraVENous Q24H April [facility], APRN - CNP   1,000 mg at 09/07/21 1402    hydrALAZINE (APRESOLINE) injection 5 mg  5 mg IntraVENous Q6H PRN April [facility], APRN - CNP   5 mg at 09/08/21 0550    fentaNYL (SUBLIMAZE) injection 25 mcg  25 mcg IntraVENous Once Annemarie Gilman DO        HYDROcodone-acetaminophen (NORCO) 5-325 MG per tablet 1 tablet  1 tablet Oral Q6H PRN Joselyn Lindsay MD   1 tablet at 09/08/21 0925    0.9 09/08/2021     09/08/2021    MCV 91.3 09/08/2021    MCH 31.5 09/08/2021    MCHC 34.5 09/08/2021    RDW 12.3 09/08/2021    LYMPHOPCT 10.0 09/08/2021    MONOPCT 6.8 09/08/2021    BASOPCT 0.2 09/08/2021    MONOSABS 1.11 09/08/2021    LYMPHSABS 1.64 09/08/2021    EOSABS 0.01 09/08/2021    BASOSABS 0.03 09/08/2021     CMP:    Lab Results   Component Value Date     09/08/2021    K 3.6 09/08/2021    K 3.6 09/07/2021     09/08/2021    CO2 22 09/08/2021    BUN 19 09/08/2021    CREATININE 0.8 09/08/2021    GFRAA >60 09/08/2021    LABGLOM >60 09/08/2021    GLUCOSE 136 09/08/2021    GLUCOSE 96 10/31/2011    PROT 7.6 09/05/2021    LABALBU 4.0 09/05/2021    LABALBU 4.0 10/31/2011    CALCIUM 8.5 09/08/2021    BILITOT 0.8 09/05/2021    ALKPHOS 91 09/05/2021    AST 27 09/05/2021    ALT 14 09/05/2021     HgBA1c:    Lab Results   Component Value Date    LABA1C 5.7 08/04/2021     FLP:    Lab Results   Component Value Date    TRIG 217 08/04/2021    HDL 67 08/04/2021    LDLCALC 148 08/04/2021    LABVLDL 29 05/16/2016     CT head 9/5: Chronic appearing large cystic collection in the left frontotemporal region with mass effect and midline shift as noted likely an arachnoid cyst or subdural hygroma     CTA head/neck: No acute intracranial abnormality. 3.4 x 12 cm arachnoid cyst along the left cerebral convexity, stable. Stable 5 mm left to right midline shift. Old lacunar infarcts in the bilateral cerebellar hemispheres. 2. Occlusion of the V3 and V4 segments of the left vertebral artery, likely related to thrombus, possibly acute. Asymmetrically decreased contrast opacification of the distal V2 segment of the left vertebral artery, likely related to distal occlusion. 3. 70% stenosis in the mid M1 segment of the left MCA. 4. Mild stenosis in the mid basilar artery and the P2 segments of the bilateral PCAs. 5. 75% focal stenosis at the origin of the left vertebral artery.  6. 80% stenosis in the proximal V2 segment of the right vertebral artery secondary to compression by osteophytes. 7. 2.1 cm left thyroid nodule. Follow-up recommendation is listed below. 8. Prominence of the bilateral aryepiglottic folds with asymmetric decreased size of the left piriform sinus, nonspecific. Recommend follow-up with direct visualization to exclude underlying mass. All labs and images personally reviewed today    Assessment:     Probable acute posterior circulation stroke secondary to acute L VA thrombus: possibly L cerebellar in light of vertigo symptoms and previous cerebellar strokes seen on CTAs. Need repeat imaging to eval for stroke evolution, but cannot have MRI. Her exam is nonfocal but her vertigo persists. Medical management at this time is recommended with antiplatelet and statin--possible anticoag in the future pending reassessment and functional status of pt.  Her dizziness may persist and increase her risk for falls    Plan:     Repeat CT head WO tomorrow afternoon    Start DAPT--timing TBD in OP setting    Start low dose atorvastatin 10 mg daily (pt agreeable)--will stop if weakness or myalgias worsen    Echo w bubble for risk stratification    BP control--recommend slow reduction to goal <130-140/80    Stroke education, PT/OT    Discussed with Dr. Caio Cueto and Dr. Sebastian Nicholson as explained above    Updated her son, Kwame Shen, via phone--he is in agreement with re-trial of statin    Stroke clinic follow up--consider repeating CTAs head/neck in 4-6 weeks to eval for resolution of thrombus    YO Beavers CNP,   12:13 PM  9/8/2021

## 2021-09-08 NOTE — PLAN OF CARE
Problem: Falls - Risk of:  Goal: Will remain free from falls  Description: Will remain free from falls  Outcome: Met This Shift     Problem: Skin Integrity:  Goal: Absence of new skin breakdown  Description: Absence of new skin breakdown  Outcome: Met This Shift     Problem: Pain:  Goal: Pain level will decrease  Description: Pain level will decrease  Outcome: Met This Shift     Problem: Inadequate oral food/beverage intake (NI-2.1)  Goal: Food and/or Nutrient Delivery  Description: Individualized approach for food/nutrient provision.   9/8/2021 1334 by Giovanna Catherine RD, WINTER  Outcome: Met This Shift

## 2021-09-08 NOTE — PLAN OF CARE
Problem: Falls - Risk of:  Goal: Will remain free from falls  Description: Will remain free from falls  Outcome: Met This Shift  Goal: Absence of physical injury  Description: Absence of physical injury  9/7/2021 2340 by Emerson Galindo RN  Outcome: Met This Shift  9/7/2021 1843 by Usha Moore RN  Outcome: Met This Shift     Problem: Skin Integrity:  Goal: Will show no infection signs and symptoms  Description: Will show no infection signs and symptoms  9/7/2021 2340 by Emerson Galindo RN  Outcome: Met This Shift  9/7/2021 1843 by Usha Moore RN  Outcome: Met This Shift  Goal: Absence of new skin breakdown  Description: Absence of new skin breakdown  Outcome: Met This Shift     Problem: Pain:  Goal: Control of acute pain  Description: Control of acute pain  9/7/2021 1843 by Usha Moore RN  Outcome: Met This Shift

## 2021-09-08 NOTE — PROGRESS NOTES
Comprehensive Nutrition Assessment    Type and Reason for Visit:  Initial, Positive Nutrition Screen    Nutrition Recommendations/Plan: Recommend and start Ensure Enlive supplement daily to help meet nutritional needs. Nutrition Assessment:  Patient at nutritional risk d/t decreased po intake of meals since admission (~50%) ; noted N/V and loose stools PTA ; s/p fall ; noted diverticulitis ; noted large chronic left frontotemporal arachnoid cyst ; will start ONS    Malnutrition Assessment:  Malnutrition Status: At risk for malnutrition (Comment)    Context:  Acute Illness     Findings of the 6 clinical characteristics of malnutrition:  Energy Intake:  Mild decrease in energy intake (Comment) (x 3 days since admission)  Weight Loss:  No significant weight loss     Body Fat Loss:  Unable to assess     Muscle Mass Loss:  Unable to assess    Fluid Accumulation:  No significant fluid accumulation     Strength:  Not Performed    Estimated Daily Nutrient Needs:  Energy (kcal):  0650-7339 (REE 1038 x 1.1 SF); Weight Used for Energy Requirements:  Current     Protein (g):  65-75 (1.2-1.4g/kg IBW); Weight Used for Protein Requirements:  Ideal        Fluid (ml/day):  6229-8812; Method Used for Fluid Requirements:  1 ml/kcal      Nutrition Related Findings:  I&Os WNL, no edema, active BS, A&O x 4, missing teeth, boggy heels      Wounds:  None       Current Nutrition Therapies:    ADULT DIET; Regular; Low Fat/Low Chol/High Fiber/2 gm Na; Low Sodium (2 gm)    Anthropometric Measures:  · Height: 5' 3\" (160 cm)  · Current Body Weight: 145 lb (65.8 kg) (9/8, bedscale)   · Usual Body Weight: 140 lb (63.5 kg) (8/4/21, actual)     · Ideal Body Weight: 115 lbs; % Ideal Body Weight 126.1 %   · BMI: 25.7  · BMI Categories: Overweight (BMI 25.0-29. 9)       Nutrition Diagnosis:   · Inadequate oral intake related to inadequate protein-energy intake (2/2 N/V PTA) as evidenced by poor intake prior to admission, intake 26-50%, intake 51-75%, nausea, vomiting, diarrhea      Nutrition Interventions:   Food and/or Nutrient Delivery:  Continue Current Diet, Start Oral Nutrition Supplement  Nutrition Education/Counseling:  Education not indicated   Coordination of Nutrition Care:  Continue to monitor while inpatient    Goals:  Pt will consume 50-75% of meals served       Nutrition Monitoring and Evaluation:   Behavioral-Environmental Outcomes:  None Identified   Food/Nutrient Intake Outcomes:  Food and Nutrient Intake, Supplement Intake  Physical Signs/Symptoms Outcomes:  Biochemical Data, Chewing or Swallowing, GI Status, Nausea or Vomiting, Diarrhea, Fluid Status or Edema, Hemodynamic Status, Meal Time Behavior, Nutrition Focused Physical Findings, Skin, Weight     Discharge Planning:     Too soon to determine     Electronically signed by Katie Parisi RD, LD on 9/8/21 at 1:35 PM EDT    Contact: [phone]

## 2021-09-09 ENCOUNTER — APPOINTMENT (OUTPATIENT)
Dept: CT IMAGING | Age: 86
DRG: 391 | End: 2021-09-09
Payer: MEDICARE

## 2021-09-09 ENCOUNTER — APPOINTMENT (OUTPATIENT)
Dept: GENERAL RADIOLOGY | Age: 86
DRG: 391 | End: 2021-09-09
Payer: MEDICARE

## 2021-09-09 LAB
ANION GAP SERPL CALCULATED.3IONS-SCNC: 13 MMOL/L (ref 7–16)
BASOPHILS ABSOLUTE: 0 E9/L (ref 0–0.2)
BASOPHILS RELATIVE PERCENT: 0.2 % (ref 0–2)
BUN BLDV-MCNC: 19 MG/DL (ref 6–23)
BURR CELLS: ABNORMAL
CALCIUM SERPL-MCNC: 8.7 MG/DL (ref 8.6–10.2)
CHLORIDE BLD-SCNC: 98 MMOL/L (ref 98–107)
CO2: 27 MMOL/L (ref 22–29)
CREAT SERPL-MCNC: 1 MG/DL (ref 0.5–1)
EOSINOPHILS ABSOLUTE: 0 E9/L (ref 0.05–0.5)
EOSINOPHILS RELATIVE PERCENT: 0.1 % (ref 0–6)
GFR AFRICAN AMERICAN: >60
GFR NON-AFRICAN AMERICAN: 52 ML/MIN/1.73
GLUCOSE BLD-MCNC: 122 MG/DL (ref 74–99)
HCT VFR BLD CALC: 45.3 % (ref 34–48)
HEMOGLOBIN: 15.2 G/DL (ref 11.5–15.5)
LYMPHOCYTES ABSOLUTE: 1.18 E9/L (ref 1.5–4)
LYMPHOCYTES RELATIVE PERCENT: 7 % (ref 20–42)
MCH RBC QN AUTO: 31.6 PG (ref 26–35)
MCHC RBC AUTO-ENTMCNC: 33.6 % (ref 32–34.5)
MCV RBC AUTO: 94.2 FL (ref 80–99.9)
MONOCYTES ABSOLUTE: 1.18 E9/L (ref 0.1–0.95)
MONOCYTES RELATIVE PERCENT: 7 % (ref 2–12)
NEUTROPHILS ABSOLUTE: 14.53 E9/L (ref 1.8–7.3)
NEUTROPHILS RELATIVE PERCENT: 86 % (ref 43–80)
OVALOCYTES: ABNORMAL
PDW BLD-RTO: 12.2 FL (ref 11.5–15)
PLATELET # BLD: 235 E9/L (ref 130–450)
PMV BLD AUTO: 9.5 FL (ref 7–12)
POIKILOCYTES: ABNORMAL
POLYCHROMASIA: ABNORMAL
POTASSIUM SERPL-SCNC: 3.5 MMOL/L (ref 3.5–5)
RBC # BLD: 4.81 E12/L (ref 3.5–5.5)
SODIUM BLD-SCNC: 138 MMOL/L (ref 132–146)
WBC # BLD: 16.9 E9/L (ref 4.5–11.5)

## 2021-09-09 PROCEDURE — 36415 COLL VENOUS BLD VENIPUNCTURE: CPT

## 2021-09-09 PROCEDURE — 97535 SELF CARE MNGMENT TRAINING: CPT

## 2021-09-09 PROCEDURE — 74022 RADEX COMPL AQT ABD SERIES: CPT

## 2021-09-09 PROCEDURE — 70450 CT HEAD/BRAIN W/O DYE: CPT

## 2021-09-09 PROCEDURE — 97530 THERAPEUTIC ACTIVITIES: CPT

## 2021-09-09 PROCEDURE — 2500000003 HC RX 250 WO HCPCS: Performed by: NURSE PRACTITIONER

## 2021-09-09 PROCEDURE — 99231 SBSQ HOSP IP/OBS SF/LOW 25: CPT | Performed by: PHYSICIAN ASSISTANT

## 2021-09-09 PROCEDURE — 6370000000 HC RX 637 (ALT 250 FOR IP): Performed by: INTERNAL MEDICINE

## 2021-09-09 PROCEDURE — 2060000000 HC ICU INTERMEDIATE R&B

## 2021-09-09 PROCEDURE — 84145 PROCALCITONIN (PCT): CPT

## 2021-09-09 PROCEDURE — 6360000002 HC RX W HCPCS: Performed by: NURSE PRACTITIONER

## 2021-09-09 PROCEDURE — 80048 BASIC METABOLIC PNL TOTAL CA: CPT

## 2021-09-09 PROCEDURE — 6370000000 HC RX 637 (ALT 250 FOR IP): Performed by: NURSE PRACTITIONER

## 2021-09-09 PROCEDURE — 2580000003 HC RX 258: Performed by: NURSE PRACTITIONER

## 2021-09-09 PROCEDURE — 85025 COMPLETE CBC W/AUTO DIFF WBC: CPT

## 2021-09-09 RX ORDER — AMLODIPINE BESYLATE 5 MG/1
5 TABLET ORAL DAILY
Status: DISCONTINUED | OUTPATIENT
Start: 2021-09-09 | End: 2021-09-13 | Stop reason: HOSPADM

## 2021-09-09 RX ADMIN — METRONIDAZOLE 500 MG: 500 INJECTION, SOLUTION INTRAVENOUS at 05:15

## 2021-09-09 RX ADMIN — TIMOLOL MALEATE 1 DROP: 5 SOLUTION OPHTHALMIC at 08:38

## 2021-09-09 RX ADMIN — HYDROCODONE BITARTRATE AND ACETAMINOPHEN 1 TABLET: 5; 325 TABLET ORAL at 20:38

## 2021-09-09 RX ADMIN — MECLIZINE 12.5 MG: 12.5 TABLET ORAL at 17:47

## 2021-09-09 RX ADMIN — ASPIRIN 81 MG CHEWABLE TABLET 81 MG: 81 TABLET CHEWABLE at 08:39

## 2021-09-09 RX ADMIN — MECLIZINE 12.5 MG: 12.5 TABLET ORAL at 05:15

## 2021-09-09 RX ADMIN — LATANOPROST 1 DROP: 50 SOLUTION OPHTHALMIC at 20:45

## 2021-09-09 RX ADMIN — METRONIDAZOLE 500 MG: 500 INJECTION, SOLUTION INTRAVENOUS at 20:39

## 2021-09-09 RX ADMIN — MECLIZINE 12.5 MG: 12.5 TABLET ORAL at 12:25

## 2021-09-09 RX ADMIN — HYDROCODONE BITARTRATE AND ACETAMINOPHEN 1 TABLET: 5; 325 TABLET ORAL at 08:38

## 2021-09-09 RX ADMIN — AMLODIPINE BESYLATE 5 MG: 5 TABLET ORAL at 12:25

## 2021-09-09 RX ADMIN — ENOXAPARIN SODIUM 40 MG: 40 INJECTION SUBCUTANEOUS at 08:39

## 2021-09-09 RX ADMIN — ESTROGENS, CONJUGATED 0.3 MG: 0.3 TABLET, FILM COATED ORAL at 08:38

## 2021-09-09 RX ADMIN — Medication 10 ML: at 20:39

## 2021-09-09 RX ADMIN — ATORVASTATIN CALCIUM 10 MG: 10 TABLET, FILM COATED ORAL at 20:38

## 2021-09-09 RX ADMIN — WATER 1000 MG: 1 INJECTION INTRAMUSCULAR; INTRAVENOUS; SUBCUTANEOUS at 15:23

## 2021-09-09 RX ADMIN — CLOPIDOGREL 75 MG: 75 TABLET, FILM COATED ORAL at 08:39

## 2021-09-09 RX ADMIN — METRONIDAZOLE 500 MG: 500 INJECTION, SOLUTION INTRAVENOUS at 12:25

## 2021-09-09 RX ADMIN — Medication 10 ML: at 09:15

## 2021-09-09 RX ADMIN — MECLIZINE 12.5 MG: 12.5 TABLET ORAL at 01:03

## 2021-09-09 RX ADMIN — MECLIZINE 12.5 MG: 12.5 TABLET ORAL at 22:26

## 2021-09-09 ASSESSMENT — PAIN DESCRIPTION - DESCRIPTORS
DESCRIPTORS: CONSTANT;DISCOMFORT;ACHING
DESCRIPTORS: CONSTANT;DISCOMFORT
DESCRIPTORS: ACHING;SORE;TENDER

## 2021-09-09 ASSESSMENT — PAIN DESCRIPTION - PAIN TYPE
TYPE: ACUTE PAIN

## 2021-09-09 ASSESSMENT — PAIN DESCRIPTION - PROGRESSION
CLINICAL_PROGRESSION: NOT CHANGED

## 2021-09-09 ASSESSMENT — PAIN SCALES - GENERAL
PAINLEVEL_OUTOF10: 0
PAINLEVEL_OUTOF10: 9
PAINLEVEL_OUTOF10: 8
PAINLEVEL_OUTOF10: 9
PAINLEVEL_OUTOF10: 9

## 2021-09-09 ASSESSMENT — PAIN DESCRIPTION - LOCATION
LOCATION: ARM;RIB CAGE

## 2021-09-09 ASSESSMENT — PAIN DESCRIPTION - ONSET
ONSET: ON-GOING

## 2021-09-09 ASSESSMENT — PAIN DESCRIPTION - FREQUENCY
FREQUENCY: CONTINUOUS

## 2021-09-09 ASSESSMENT — PAIN DESCRIPTION - ORIENTATION
ORIENTATION: LEFT

## 2021-09-09 NOTE — PROGRESS NOTES
Corey Santiago is a 80 y.o. right handed female     Neuro is following for dizziness and falls    PMH: Osteoporosis, CTS, HLD    Synopsis:  She has been having difficulty with balance for the past 2 years associated with spinning sensations. 1 to 2 days prior to admission she began to have nausea, vomiting, and diarrhea associated with severe dizziness and weakness, resulting in fall. /106. CT showed large left frontotemporal cystic structure with mass-effect and midline shift. CTA of her head and neck showed suspected acute thrombus in her left V3-V4 as well as multiple other areas of intracranial atherosclerosis. Neuro spoke with Dr. Bandar Mcghee yesterday who recommended medical management with DAPT for now, with consideration of anticoagulation pending her functional status and suspected stroke size. NSGY does not feel her L frontotemporal arachonoid cyst is causing any of her issues. She continues to feel vertigo sensations but denies any nausea or weakness. She feels slightly improved today. . She is now on low-dose atorvastatin which she agreed to yesterday. So far, she has no side effects from this. Unfortunately, she is unable to have MRI of the brain due to recent hip surgery. Still hypertensive, but otherwise stable. Repeat CT head planned for today. Echo pending.      Current Facility-Administered Medications   Medication Dose Route Frequency Provider Last Rate Last Admin    amLODIPine (NORVASC) tablet 5 mg  5 mg Oral Daily Mary Alice Parnell MD        aspirin chewable tablet 81 mg  81 mg Oral Daily YO Ibrahim CNP   81 mg at 09/09/21 0839    clopidogrel (PLAVIX) tablet 75 mg  75 mg Oral Daily YO Ibrahim CNP   75 mg at 09/09/21 0839    atorvastatin (LIPITOR) tablet 10 mg  10 mg Oral Nightly YO Ibrahim CNP   10 mg at 09/08/21 8062    meclizine (ANTIVERT) tablet 12.5 mg  12.5 mg Oral 4 times per day Mary Alice Parnell MD   12.5 mg at 09/09/21 5600  latanoprost (XALATAN) 0.005 % ophthalmic solution 1 drop  1 drop Ophthalmic Nightly Iron Myers MD   1 drop at 09/08/21 2220    timolol (TIMOPTIC) 0.5 % ophthalmic solution 1 drop  1 drop Ophthalmic Daily Iron Myers MD   1 drop at 09/09/21 0365    estrogens (conjugated) (PREMARIN) tablet 0.3 mg  0.3 mg Oral Daily Iron Myers MD   0.3 mg at 09/09/21 7231    sodium chloride flush 0.9 % injection 5-40 mL  5-40 mL IntraVENous 2 times per day April Josee-Saad, APRN - CNP   10 mL at 09/09/21 0915    sodium chloride flush 0.9 % injection 5-40 mL  5-40 mL IntraVENous PRN April Josee-Cordova, APRN - CNP   10 mL at 09/07/21 0606    0.9 % sodium chloride infusion  25 mL IntraVENous PRN April Cloud-Cordova, APRN - CNP        enoxaparin (LOVENOX) injection 40 mg  40 mg SubCUTAneous Daily April Cloud-Cordova, APRN - CNP   40 mg at 09/09/21 7951    polyethylene glycol (GLYCOLAX) packet 17 g  17 g Oral Daily PRN April Cloud-Saad, APRN - CNP        acetaminophen (TYLENOL) tablet 650 mg  650 mg Oral Q6H PRN April Josee-Saad, APRN - CNP        Or    acetaminophen (TYLENOL) suppository 650 mg  650 mg Rectal Q6H PRN April Cloud-Saad, APRN - CNP        prochlorperazine (COMPAZINE) injection 5 mg  5 mg IntraVENous Q6H PRN April Cloud-Cordova, APRN - CNP   5 mg at 09/08/21 0932    metronidazole (FLAGYL) 500 mg in NaCl 100 mL IVPB premix  500 mg IntraVENous Q8H April Cloud-Saad, APRN - CNP   Stopped at 09/09/21 0630    cefTRIAXone (ROCEPHIN) 1,000 mg in sterile water 10 mL IV syringe  1,000 mg IntraVENous Q24H April Cloud-Saad, APRN - CNP   1,000 mg at 09/08/21 1505    hydrALAZINE (APRESOLINE) injection 5 mg  5 mg IntraVENous Q6H PRN April Cloud-Cordova, APRN - CNP   5 mg at 09/08/21 0550    fentaNYL (SUBLIMAZE) injection 25 mcg  25 mcg IntraVENous Once Annemarie Gilman DO        HYDROcodone-acetaminophen (NORCO) 5-325 MG per tablet 1 tablet  1 tablet Oral Q6H PRN Brittney Grossman MD Daniella   1 tablet at 09/09/21 0838    0.9 % sodium chloride bolus  1,000 mL IntraVENous Once Remus Pod, DO        fentaNYL (SUBLIMAZE) injection 25 mcg  25 mcg IntraVENous Once Annemarie Gilman DO         Objective:     BP (!) 156/80   Pulse 99   Temp 96.8 °F (36 °C) (Temporal)   Resp 18   Ht 5' 3\" (1.6 m)   Wt 145 lb 6.4 oz (66 kg)   SpO2 94%   BMI 25.76 kg/m²     General appearance: alert, appears stated age, cooperative and no distress, sitting up in chair eating lunch  Head: normocephalic, without obvious abnormality, atraumatic  Eyes: conjunctivae/corneas clear. .  Neck: full ROM  Lungs: clear to auscultation bilaterally  Heart: regular rate and rhythm  Extremities: normal, atraumatic, no cyanosis or edema  Pulses: 2+ and symmetric  Skin: color, texture, turgor normal---no rashes or lesions    Mental Status: alert, oriented.  Pleasant     Appropriate attention/concentration  Intact fundus of knowledge  Intact memories    Speech: no dysarthria  Language: no aphasias    Cranial Nerves:  I: smell    II: visual acuity     II: visual fields Full to confrontation   II: pupils RODRIGO   III,VII: ptosis None   III,IV,VI: extraocular muscles  Full ROM   V: mastication Normal   V: facial light touch sensation  Normal   V,VII: corneal reflex     VII: facial muscle function - upper  Normal   VII: facial muscle function - lower Normal   VIII: hearing Normal   IX: soft palate elevation  Normal   IX,X: gag reflex    XI: trapezius strength  5/5   XI: sternocleidomastoid strength 5/5   XI: neck extension strength  5/5   XII: tongue strength  Normal     Motor:  5/5 throughout  Normal bulk and tone  No drift or abnormal movements    Sensory:  LT normal in all limbs    Coordination:   FN normal b/l  FFM and ARCELIA normal b/l    DTR:   2+ throughout    No Babinskis  No Martinez's    No pathological reflexes    Laboratory/Radiology:     CBC with Differential:    Lab Results   Component Value Date    WBC 16.9 09/09/2021 RBC 4.81 09/09/2021    HGB 15.2 09/09/2021    HCT 45.3 09/09/2021     09/09/2021    MCV 94.2 09/09/2021    MCH 31.6 09/09/2021    MCHC 33.6 09/09/2021    RDW 12.2 09/09/2021    LYMPHOPCT 10.0 09/08/2021    MONOPCT 6.8 09/08/2021    BASOPCT 0.2 09/08/2021    MONOSABS 1.11 09/08/2021    LYMPHSABS 1.64 09/08/2021    EOSABS 0.01 09/08/2021    BASOSABS 0.03 09/08/2021     CMP:    Lab Results   Component Value Date     09/09/2021    K 3.5 09/09/2021    K 3.6 09/07/2021    CL 98 09/09/2021    CO2 27 09/09/2021    BUN 19 09/09/2021    CREATININE 1.0 09/09/2021    GFRAA >60 09/09/2021    LABGLOM 52 09/09/2021    GLUCOSE 122 09/09/2021    GLUCOSE 96 10/31/2011    PROT 7.6 09/05/2021    LABALBU 4.0 09/05/2021    LABALBU 4.0 10/31/2011    CALCIUM 8.7 09/09/2021    BILITOT 0.8 09/05/2021    ALKPHOS 91 09/05/2021    AST 27 09/05/2021    ALT 14 09/05/2021     HgBA1c:    Lab Results   Component Value Date    LABA1C 5.7 08/04/2021     FLP:    Lab Results   Component Value Date    TRIG 217 08/04/2021    HDL 67 08/04/2021    LDLCALC 148 08/04/2021    LABVLDL 29 05/16/2016     CT head 9/5: Chronic appearing large cystic collection in the left frontotemporal region with mass effect and midline shift as noted likely an arachnoid cyst or subdural hygroma     CTA head/neck: No acute intracranial abnormality. 3.4 x 12 cm arachnoid cyst along the left cerebral convexity, stable. Stable 5 mm left to right midline shift. Old lacunar infarcts in the bilateral cerebellar hemispheres. 2. Occlusion of the V3 and V4 segments of the left vertebral artery, likely related to thrombus, possibly acute. Asymmetrically decreased contrast opacification of the distal V2 segment of the left vertebral artery, likely related to distal occlusion. 3. 70% stenosis in the mid M1 segment of the left MCA. 4. Mild stenosis in the mid basilar artery and the P2 segments of the bilateral PCAs.  5. 75% focal stenosis at the origin of the left vertebral artery. 6. 80% stenosis in the proximal V2 segment of the right vertebral artery secondary to compression by osteophytes. 7. 2.1 cm left thyroid nodule. Follow-up recommendation is listed below. 8. Prominence of the bilateral aryepiglottic folds with asymmetric decreased size of the left piriform sinus, nonspecific. Recommend follow-up with direct visualization to exclude underlying mass. All labs and images personally reviewed today    Assessment:     Probable acute posterior circulation stroke secondary to acute L VA thrombus: possibly L cerebellar in light of vertigo symptoms and previous cerebellar strokes seen on CTAs. Need repeat imaging to eval for stroke evolution, but cannot have MRI. Her exam is nonfocal but her vertigo persists. Medical management at this time is recommended with antiplatelet and statin--possible anticoag in the future pending reassessment and functional status of pt.  Her dizziness may persist and increase her risk for falls    Plan:     Repeat CT head WO this afternoon     Start DAPT--timing TBD in OP setting    Start low dose atorvastatin 10 mg daily (pt agreeable)--will stop if weakness or myalgias worsen   No reported side effects today     Echo w bubble for risk stratification    BP control--recommend slow reduction to goal <130-140/80    Stroke education, PT/OT    Stroke clinic follow up--consider repeating CTAs head/neck in 4-6 weeks to eval for resolution of thrombus    Hodan Chandler PA-C  12:08 PM  9/9/2021

## 2021-09-09 NOTE — PROGRESS NOTES
Orthostatic Blood Pressure    BP Lyin/70  Pulse Lyin    BP Sittin/76  Pulse Sittin    BP Standin/64  Pulse Standin

## 2021-09-09 NOTE — PROGRESS NOTES
Occupational Therapy            OT BEDSIDE TREATMENT NOTE       [address] [address] [facility]  [address] [place] [address]     Date:2021  Patient Name: Traci Nunes  MRN: [id number]  : 1929  Room: [address] [place] [address]     Evaluating OT: CANDI Michael  Supervising therapist: LONG Kang, OTR/L; #[id number]     Referring Provider: YO Maddox CNP  Specific Provider Orders/Date: OT Evaluation and Treatment, 21     Diagnosis: Intracranial space-occupying lesion [R90.0]  Dizziness [R42]  Diverticulitis of colon [K57.32]  Surgery: None   Pertinent Medical History: Age related osteoporosis, carpal tunnel syndrome, gastro-esophageal reflux, hyperlipidemia, s/p total hip arthroplasty ()     Precautions:  Fall Risk, Alarm, tele        Assessment of current deficits   [x]? Functional mobility                         [x]?ADLs           [x]? Strength                  [x]? Cognition   [x]? Functional transfers           [x]? IADLs         [x]? Safety Awareness   [x]? Endurance   [x]? Fine Coordination              [x]? Balance      []? Vision/perception   []? Sensation     []? Gross Motor Coordination  []? ROM           []?  Delirium                   []? Motor Control      OT PLAN OF CARE   OT POC based on physician orders, patient diagnosis and results of clinical assessment     Frequency/Duration: 1-3 days/wk for 2 weeks PRN   Specific OT Treatment Interventions to include:   * Instruction/training on adapted ADL techniques and AE recommendations to increase functional independence within precautions       * Training on energy conservation strategies, correct breathing pattern and techniques to improve independence/tolerance for self-care routine  * Functional transfer/mobility training/DME recommendations for increased independence, safety, and fall prevention  * Patient/Family education to increase follow through with safety techniques and functional independence  * Recommendation of environmental modifications for increased safety with functional transfers/mobility and ADLs  * Cognitive retraining/development of therapeutic activities to improve problem solving, judgement, memory, and attention for increased safety/participation in ADL/IADL tasks  * Therapeutic exercise to improve motor endurance, ROM, and functional strength for ADLs/functional transfers  * Therapeutic activities to facilitate/challenge dynamic balance, stand tolerance for increased safety and independence with ADLs  * Therapeutic activities to facilitate gross/fine motor skills for increased independence with ADLs  * Positioning to improve skin integrity, interaction with environment and functional independence     Recommended Adaptive Equipment: AE for LB dressing/ bathing     Home Living: Patient's son lives with her in 1 story home with 4 steps to enter and HR. Bedroom/bathroom are located on the 1st floor. Bathroom setup: walk-in shower with shower chair and standard commode   Equipment owned: shower chair, bed-side commode, cane, ww, sock-aid     Prior Level of Function: I/mod I with ADLs and with IADLs. Laundry is located in the basement down full flight with HR. Patient reported her son is able to assist when she returns home. Patient completed functional mobility using no device. Driving: Yes  Occupation: Retired Seamstress     9/9/2021  Pain Level: 9/10 in L ribs. Nursing notified. Cognition: A&O: 4/4; Follows 1-3 step directions              Memory:  Fair+              Sequencing:  Fair+              Problem solving:  Fair  Judgement/safety:  Fair                Functional Assessment:  AM-PAC Daily Activity Raw Score: 16/24    Initial Eval Status  Date: 8/9/21 Treatment Status  Date:9/9/2021 STGs = LTGs  Time frame: 10-14 days   Feeding Independent        Grooming Stand by Assist   Seated SBA  Patient washed face seated in chair with SBA for balance.  Stand by Assist Standing at sink   UB Dressing Minimal Assist   Don/doffed hospital gown with Min A for balance seated EOB. Patient required assistance with line management.  Minimal Assist   Don/doffed hospital gown seated in chair. Patient required assistance with line management/ tieing gown. Modified [place]   Seated   LB Dressing Maximal Assist   Moderate Assist overall  Seated EOB, patient required total A with donning socks. Seated in chair, patient total A with doffing socks. Patient donned socks with sock-aid and Min A for positioning. Patient donned briefs with reacher and Min A for positioning. Patient required verbal cues for AE positioning. Patient required Max A to pull up briefs while standing with ww and Min A for balance. Stand by Assist   Seated with AE as needed   Bathing Moderate Assist Moderate Assist  Sponge bathing completed seated for safety with exception of rear heather-hygiene completed standing. Stand by Assist   Seated with AE as needed   Toileting Moderate Assist  NT Stand by Assist    Bed Mobility  Supine to sit: Minimal Assist   HOB elevatded  Sit to supine: Minimal Assist   Supine to sit: SBA  HOB elevatded Supine to sit: Modified [place]   Sit to supine: Modified [place]    Functional Transfers Sit <> stand various surfaces: NT secondary to dizziness/ nauseous Sit <> stand EOB: Min A with ww   2x Sit <> stand chair: Min A with ww  Patient required verbal cues for hand positioning with ww.  Stand by Assist with AD   Functional Mobility  NT secondary to dizziness/ nauseous 5-6 steps: Min A with ww  EOB <> chair  Patient required verbal cues for ww positioning.  Stand by Assist with AD  Short functional household distance   Balance Sitting:     Static/ Dynamic: Min A  Standing: NT Sitting EOB:     Static/ Dynamic EOB: Min A  Standing: Min A with ww      Activity Tolerance Fair- with light activity Fair- with moderate activity  Fair+ with moderate activity   Visual/  Perceptual Glasses: Yes, donned          Vitals  Upon entry semi-supine in bed on RA, SpO2 sat 93%,  bpm  Seated EOB on RA, SpO2 sat 95%, HR 96 bpm  At rest semi-supine in bed on RA, SpO2 sat 96%, HR 77 bpm  Upon entry semi-supine in bed on RA, SpO2 sat 92%, HR 53 bpm  After functional mobility EOB to chair on RA, SpO2 sat 98%, HR 61 bpm  At rest seated in chair on RA, SpO2 sat 95%, HR 56 bpm          Comments: Nursing approved session. Upon arrival pt semi-supine in bed. Patient appeared nervous/ fearful at beginning of session, which began to decrease as session progressed. Patient educated on pursed-lip breathing secondary to dizziness/ nausea when sitting EOB and demonstrated good understanding. Vitals collected and WFL. Nursing notified of dizziness/ nausea and patient willing to continue with therapy. Patient educated on AE, reacher and sock-aid to complete LB dressing and demonstrated good understanding. Patient reported she owns and utilizes the sock-aid as needed previously after hip surgery. Patient educated on energy conservation techniques/modifications at home and verbalized good understanding. At end of session pt seated in chair with all lines and tubes intact, call light within reach. Nursing present. · Pt has made good progress towards set goals. · Continue with current plan of care with education on energy conservation techniques to complete functional tasks to promote safety and independence.       Treatment Time In: 7:43            Treatment Time Out: 8:33          Treatment Charges: Mins Units   Ther Ex  [id number]     Manual Therapy Silvio Tee [phone] [id number] [phone]   ADL/Home Mgt [id number] 40 2   Neuro Re-ed [id number]     Group Therapy      Orthotic manage/training  [id number]     Non-Billable Time     Total Timed Treatment 50 3       Completed by: CANDI Villalobos  Supervising Therapist: LONG Paul, OTR/L #[id number]

## 2021-09-10 ENCOUNTER — APPOINTMENT (OUTPATIENT)
Dept: GENERAL RADIOLOGY | Age: 86
DRG: 391 | End: 2021-09-10
Payer: MEDICARE
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
## 2021-09-10 PROBLEM — G93.0 ARACHNOID CYST: Status: ACTIVE | Noted: 2021-09-10

## 2021-09-10 PROBLEM — R19.7 DIARRHEA: Status: RESOLVED | Noted: 2021-09-05 | Resolved: 2021-09-10

## 2021-09-10 PROBLEM — R42 DIZZINESS: Status: RESOLVED | Noted: 2021-09-05 | Resolved: 2021-09-10

## 2021-09-10 LAB — PROCALCITONIN: 0.11 NG/ML (ref 0–0.08)

## 2021-09-10 PROCEDURE — 6370000000 HC RX 637 (ALT 250 FOR IP): Performed by: INTERNAL MEDICINE

## 2021-09-10 PROCEDURE — 6360000002 HC RX W HCPCS: Performed by: INTERNAL MEDICINE

## 2021-09-10 PROCEDURE — 2060000000 HC ICU INTERMEDIATE R&B

## 2021-09-10 PROCEDURE — 2500000003 HC RX 250 WO HCPCS: Performed by: NURSE PRACTITIONER

## 2021-09-10 PROCEDURE — 2580000003 HC RX 258: Performed by: INTERNAL MEDICINE

## 2021-09-10 PROCEDURE — 71045 X-RAY EXAM CHEST 1 VIEW: CPT

## 2021-09-10 PROCEDURE — 97530 THERAPEUTIC ACTIVITIES: CPT

## 2021-09-10 PROCEDURE — 99233 SBSQ HOSP IP/OBS HIGH 50: CPT | Performed by: NURSE PRACTITIONER

## 2021-09-10 PROCEDURE — 6370000000 HC RX 637 (ALT 250 FOR IP): Performed by: NURSE PRACTITIONER

## 2021-09-10 PROCEDURE — 6360000002 HC RX W HCPCS: Performed by: NURSE PRACTITIONER

## 2021-09-10 PROCEDURE — 2580000003 HC RX 258: Performed by: NURSE PRACTITIONER

## 2021-09-10 RX ORDER — MECLIZINE HCL 12.5 MG/1
25 TABLET ORAL EVERY 6 HOURS SCHEDULED
Status: DISCONTINUED | OUTPATIENT
Start: 2021-09-10 | End: 2021-09-13 | Stop reason: HOSPADM

## 2021-09-10 RX ADMIN — SODIUM CHLORIDE 25 ML: 9 INJECTION, SOLUTION INTRAVENOUS at 16:19

## 2021-09-10 RX ADMIN — Medication 10 ML: at 20:38

## 2021-09-10 RX ADMIN — PIPERACILLIN AND TAZOBACTAM 3375 MG: 3; .375 INJECTION, POWDER, LYOPHILIZED, FOR SOLUTION INTRAVENOUS at 13:46

## 2021-09-10 RX ADMIN — METRONIDAZOLE 500 MG: 500 INJECTION, SOLUTION INTRAVENOUS at 05:03

## 2021-09-10 RX ADMIN — ENOXAPARIN SODIUM 40 MG: 40 INJECTION SUBCUTANEOUS at 08:18

## 2021-09-10 RX ADMIN — WATER 1000 MG: 1 INJECTION INTRAMUSCULAR; INTRAVENOUS; SUBCUTANEOUS at 12:23

## 2021-09-10 RX ADMIN — HYDRALAZINE HYDROCHLORIDE 5 MG: 20 INJECTION INTRAMUSCULAR; INTRAVENOUS at 23:29

## 2021-09-10 RX ADMIN — HYDROCODONE BITARTRATE AND ACETAMINOPHEN 1 TABLET: 5; 325 TABLET ORAL at 20:45

## 2021-09-10 RX ADMIN — ESTROGENS, CONJUGATED 0.3 MG: 0.3 TABLET, FILM COATED ORAL at 08:18

## 2021-09-10 RX ADMIN — MECLIZINE 12.5 MG: 12.5 TABLET ORAL at 12:23

## 2021-09-10 RX ADMIN — MECLIZINE 12.5 MG: 12.5 TABLET ORAL at 05:03

## 2021-09-10 RX ADMIN — HYDROCODONE BITARTRATE AND ACETAMINOPHEN 1 TABLET: 5; 325 TABLET ORAL at 05:03

## 2021-09-10 RX ADMIN — HYDROCODONE BITARTRATE AND ACETAMINOPHEN 1 TABLET: 5; 325 TABLET ORAL at 12:23

## 2021-09-10 RX ADMIN — ATORVASTATIN CALCIUM 10 MG: 10 TABLET, FILM COATED ORAL at 20:38

## 2021-09-10 RX ADMIN — ASPIRIN 81 MG CHEWABLE TABLET 81 MG: 81 TABLET CHEWABLE at 08:18

## 2021-09-10 RX ADMIN — METRONIDAZOLE 500 MG: 500 INJECTION, SOLUTION INTRAVENOUS at 12:23

## 2021-09-10 RX ADMIN — LATANOPROST 1 DROP: 50 SOLUTION OPHTHALMIC at 23:21

## 2021-09-10 RX ADMIN — TIMOLOL MALEATE 1 DROP: 5 SOLUTION OPHTHALMIC at 08:18

## 2021-09-10 RX ADMIN — MECLIZINE 25 MG: 12.5 TABLET ORAL at 23:21

## 2021-09-10 RX ADMIN — MECLIZINE 25 MG: 12.5 TABLET ORAL at 17:04

## 2021-09-10 RX ADMIN — PIPERACILLIN AND TAZOBACTAM 3375 MG: 3; .375 INJECTION, POWDER, LYOPHILIZED, FOR SOLUTION INTRAVENOUS at 20:38

## 2021-09-10 RX ADMIN — AMLODIPINE BESYLATE 5 MG: 5 TABLET ORAL at 08:18

## 2021-09-10 RX ADMIN — Medication 10 ML: at 08:18

## 2021-09-10 RX ADMIN — CLOPIDOGREL 75 MG: 75 TABLET, FILM COATED ORAL at 08:18

## 2021-09-10 ASSESSMENT — PAIN DESCRIPTION - PROGRESSION: CLINICAL_PROGRESSION: NOT CHANGED

## 2021-09-10 ASSESSMENT — PAIN SCALES - GENERAL
PAINLEVEL_OUTOF10: 10
PAINLEVEL_OUTOF10: 6
PAINLEVEL_OUTOF10: 10
PAINLEVEL_OUTOF10: 6
PAINLEVEL_OUTOF10: 5

## 2021-09-10 ASSESSMENT — PAIN DESCRIPTION - ORIENTATION
ORIENTATION: LEFT
ORIENTATION: LEFT

## 2021-09-10 ASSESSMENT — PAIN DESCRIPTION - ONSET: ONSET: ON-GOING

## 2021-09-10 ASSESSMENT — PAIN DESCRIPTION - FREQUENCY: FREQUENCY: CONTINUOUS

## 2021-09-10 ASSESSMENT — PAIN DESCRIPTION - LOCATION
LOCATION: ARM
LOCATION: ARM;RIB CAGE

## 2021-09-10 ASSESSMENT — PAIN DESCRIPTION - PAIN TYPE
TYPE: ACUTE PAIN
TYPE: ACUTE PAIN

## 2021-09-10 ASSESSMENT — PAIN DESCRIPTION - DESCRIPTORS: DESCRIPTORS: ACHING;CONSTANT;DISCOMFORT

## 2021-09-10 NOTE — DISCHARGE INSTR - COC
Continuity of Care Form    Patient Name: Traci Nunes   :  1929  MRN:  [id number]    Admit date:  2021  Discharge date:  ***    Code Status Order: Full Code   Advance Directives:     Admitting Physician:  Ro Valle MD  PCP: Nisreen Covington DO    Discharging Nurse: [facility] Unit/Room#: 6099/1324-H  Discharging Unit Phone Number: ***    Emergency Contact:   Extended Emergency Contact Information  Primary Emergency Contact: Sadiq Davidson[address] Phone: [phone]  Relation: Child  Secondary Emergency Contact:  Miranda Duffyr  Mobile Phone: [phone]  Relation: None    Past Surgical History:  Past Surgical History:   Procedure Laterality Date    TOTAL HIP ARTHROPLASTY         Immunization History:   Immunization History   Administered Date(s) Administered    COVID-19, Moderna, PF, 100mcg/0.5mL 03/15/2021, 2021       Active Problems:  Patient Active Problem List   Diagnosis Code    Diverticulitis K57.92    Nausea and vomiting R11.2    HLD (hyperlipidemia) E78.5    OAB (overactive bladder) N32.81    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    Stroke determined by clinical assessment ([facility] [address] I63.9       Isolation/Infection:   Isolation          No Isolation        Patient Infection Status     Infection Onset Added Last Indicated Last Indicated By Review Planned Expiration Resolved Resolved By    None active    Resolved    COVID-19 Rule Out 21 Respiratory Panel, Molecular, with COVID-19 (Restricted: peds pts or suitable admitted adults) (Ordered)   21 Rule-Out Test Resulted    C-diff Rule Out 21 CLOSTRIDIUM DIFFICILE EIA (Ordered)   21 Liset Nicholas RN    Discontinued by NI/[id number]    USRVR-13 Rule Out 21 COVID-19, Rapid (Ordered)   21 Rule-Out Test Resulted          Nurse Assessment:  Last Vital Signs: /69   Pulse 90   Temp 98.1 °F (36.7 °C)   Resp 18   Ht 5' 3\" (1.6 m)   Wt 145 lb 6.4 oz (66 kg)   SpO2 92%   BMI 25.76 kg/m²     Last documented pain score (0-10 scale): Pain Level: 10  Last Weight:   Wt Readings from Last 1 Encounters:   09/08/21 145 lb 6.4 oz (66 kg)     Mental Status:  oriented and alert    IV Access:  - None    Nursing Mobility/ADLs:  Walking   Assisted  Transfer  Assisted  Bathing  Assisted  Dressing  Assisted  Toileting  Assisted  Feeding  Independent  Med Admin  Independent  Med Delivery   none    Wound Care Documentation and Therapy:        Elimination:  Continence:   · Bowel: Yes  · Bladder: Yes  Urinary Catheter: None   Colostomy/Ileostomy/Ileal Conduit: No       Date of Last BM: 9/13/21    Intake/Output Summary (Last 24 hours) at 9/10/2021 1547  Last data filed at 9/10/2021 1306  Gross per 24 hour   Intake 880 ml   Output 100 ml   Net 780 ml     I/O last 3 completed shifts: In: 1360 [P.O.:1160; IV Piggyback:200]  Out: 100 [Urine:100]    Safety Concerns: At Risk for Falls    Impairments/Disabilities:      None    Nutrition Therapy:  Current Nutrition Therapy:   - Oral Diet:  General    Routes of Feeding: Oral  Liquids: Thin Liquids  Daily Fluid Restriction: no  Last Modified Barium Swallow with Video (Video Swallowing Test): done on 9/12/2021/9/12/2021    Treatments at the Time of Hospital Discharge:   Respiratory Treatments: ***  Oxygen Therapy:  is not on home oxygen therapy.   Ventilator:    - No ventilator support    Rehab Therapies: Physical Therapy and Occupational Therapy  Weight Bearing Status/Restrictions: No weight bearing restirctions  Other Medical Equipment (for information only, NOT a DME order):  walker  Other Treatments: ***    Patient's personal belongings (please select all that are sent with patient):  Rowan    RN SIGNATURE:  Electronically signed by Lynda Slaughter RN on 9/13/21 at 12:25 PM EDT    CASE MANAGEMENT/SOCIAL WORK SECTION    Inpatient Status Date: ***    Readmission Risk Assessment Score:  Readmission Risk              Risk of Unplanned Readmission:  12           Discharging to Facility/ Agency   · Name: [facility]  · Address:  · Phone:  · Fax:    Dialysis Facility (if applicable)   · Name:  · Address:  · Dialysis Schedule:  · Phone:  · Fax:    / signature: {Esignature:[id number]}    PHYSICIAN SECTION    Prognosis: {Prognosis:6576012666}    Condition at Discharge: [facility] Patient Condition:770504359}    Rehab Potential (if transferring to Rehab): {Prognosis:2480174122}    Recommended Labs or Other Treatments After Discharge: ***    Physician Certification: I certify the above information and transfer of Lissette Johnson  is necessary for the continuing treatment of the diagnosis listed and that she requires {Admit to Appropriate Level of Care:43064} for {GREATER/LESS:460999121} 30 days.      Update Admission H&P: {CHP DME Changes in WXCIR:458471532}    PHYSICIAN SIGNATURE:  {Esignature:[id number]} 42.7

## 2021-09-10 NOTE — CARE COORDINATION
Met with pt to discuss PT/OT evals, pt would like to got to [facility], and would like [facility], referral to [facility] if accepted pt would be a click 6. COVID - 9/7, pt has COVID vaccinations. HENS started, envelope and ambullete form in soft chart. Called son, Shimon parkinson to update, Shimon parkinson is agreeable for [employer]. Newton Alonzo, MSW, LSW

## 2021-09-10 NOTE — PROGRESS NOTES
Fanta Adams is a 80 y.o. right handed female     Neuro is following for dizziness and falls    PMH: Osteoporosis, CTS, HLD    Synopsis:  She has been having difficulty with balance for the past 2 years associated with spinning sensations. 1 to 2 days prior to admission she began to have nausea, vomiting, and diarrhea associated with severe dizziness and weakness, resulting in fall. /106. CT showed large left frontotemporal cystic structure with mass-effect and midline shift. CTA of her head and neck showed suspected acute thrombus in her left V3-V4 as well as multiple other areas of intracranial atherosclerosis. R/P CT head showed a possible hypodensity in her L medulla--not appreciated by radiology. She continues to feel vertigo symptoms and meclizine has been changed to 25 mg 4 times daily routinely by primary. She denies any weakness, numbness and tingling, headache, or dysphagia but has transient diplopia. She is tolerating very low-dose Lipitor well    Echo still pending. WBC 16.9 but no fever--being followed by PCP. There is no family present she is medically stable--patient's son was updated by neuro on Wednesday.     Current Facility-Administered Medications   Medication Dose Route Frequency Provider Last Rate Last Admin    piperacillin-tazobactam (ZOSYN) 3,375 mg in dextrose 5 % 100 mL IVPB extended infusion (mini-bag)  3,375 mg IntraVENous Q8H Kendal Montoya MD        meclizine (ANTIVERT) tablet 25 mg  25 mg Oral 4 times per day Kendal Montoya MD        Post Zosyn saline flush  25 mL IntraVENous Q8H Kendal Montoya MD        amLODIPine (NORVASC) tablet 5 mg  5 mg Oral Daily Kendal Montoya MD   5 mg at 09/10/21 0818    aspirin chewable tablet 81 mg  81 mg Oral Daily YO Haque CNP   81 mg at 09/10/21 0818    clopidogrel (PLAVIX) tablet 75 mg  75 mg Oral Daily YO Haque CNP   75 mg at 09/10/21 0818    atorvastatin (LIPITOR) tablet 10 mg  10 mg Oral Nightly YO Haque - CNP   10 mg at 09/09/21 2038    latanoprost (XALATAN) 0.005 % ophthalmic solution 1 drop  1 drop Ophthalmic Nightly Mario Alberto Okeefe MD   1 drop at 09/09/21 2045    timolol (TIMOPTIC) 0.5 % ophthalmic solution 1 drop  1 drop Ophthalmic Daily Mario Alberto Okeefe MD   1 drop at 09/10/21 0818    estrogens (conjugated) (PREMARIN) tablet 0.3 mg  0.3 mg Oral Daily Mario Alberto Okeefe MD   0.3 mg at 09/10/21 0818    sodium chloride flush 0.9 % injection 5-40 mL  5-40 mL IntraVENous 2 times per day April Josee-Saad, APRN - CNP   10 mL at 09/10/21 0818    sodium chloride flush 0.9 % injection 5-40 mL  5-40 mL IntraVENous PRN April Josee-Saad, APRN - CNP   10 mL at 09/07/21 0606    0.9 % sodium chloride infusion  25 mL IntraVENous PRN April Van Wert-Saad, APRN - CNP        enoxaparin (LOVENOX) injection 40 mg  40 mg SubCUTAneous Daily April Van Wert-Saad, APRN - CNP   40 mg at 09/10/21 0818    polyethylene glycol (GLYCOLAX) packet 17 g  17 g Oral Daily PRN April Josee-Saad, APRN - CNP        acetaminophen (TYLENOL) tablet 650 mg  650 mg Oral Q6H PRN April Van Wert-Culver, APRN - CNP        Or    acetaminophen (TYLENOL) suppository 650 mg  650 mg Rectal Q6H PRN April Josee-Culver, APRN - CNP        prochlorperazine (COMPAZINE) injection 5 mg  5 mg IntraVENous Q6H PRN April Van Wert-Culver, APRN - CNP   5 mg at 09/08/21 0932    hydrALAZINE (APRESOLINE) injection 5 mg  5 mg IntraVENous Q6H PRN April Josee-Culver, APRN - CNP   5 mg at 09/08/21 0550    fentaNYL (SUBLIMAZE) injection 25 mcg  25 mcg IntraVENous Once Annemarie Gilman DO        HYDROcodone-acetaminophen (NORCO) 5-325 MG per tablet 1 tablet  1 tablet Oral Q6H PRN Mario Alberto Okeefe MD   1 tablet at 09/10/21 1223    0.9 % sodium chloride bolus  1,000 mL IntraVENous Once [place], DO        fentaNYL (SUBLIMAZE) injection 25 mcg  25 mcg IntraVENous Once Arabella Binning, DO         Objective:     /69   Pulse 90   Temp 98.1 °F (36.7 °C) Resp 18   Ht 5' 3\" (1.6 m)   Wt 145 lb 6.4 oz (66 kg)   SpO2 92%   BMI 25.76 kg/m²     General appearance: alert, appears stated age, cooperative and no distress, lying in bed  Head: normocephalic, without obvious abnormality, atraumatic  Eyes: conjunctivae/corneas clear.  .  Neck: full ROM--dizzy with ROM  Lungs: clear to auscultation bilaterally  Heart: regular rate and rhythm  Extremities: normal, atraumatic, no cyanosis or edema  Pulses: 2+ and symmetric  Skin: color, texture, turgor normal---no rashes or lesions    Mental Status: alert, oriented x4--very pleasant    Appropriate attention/concentration  Intact fundus of knowledge  Intact memories    Speech: no dysarthria  Language: no aphasias    Cranial Nerves:  I: smell    II: visual acuity     II: visual fields Full to confrontation   II: pupils RODRIGO   III,VII: ptosis None   III,IV,VI: extraocular muscles  Full ROM   V: mastication Normal   V: facial light touch sensation  Normal   V,VII: corneal reflex     VII: facial muscle function - upper  Normal   VII: facial muscle function - lower Normal   VIII: hearing Normal   IX: soft palate elevation  Normal   IX,X: gag reflex    XI: trapezius strength  5/5   XI: sternocleidomastoid strength 5/5   XI: neck extension strength  5/5   XII: tongue strength  Normal     Motor:  5/5 throughout  Normal bulk and tone  No drift or abnormal movements    Sensory:  LT normal in all limbs    Coordination:   FN normal b/l  FFM and ARCELIA normal b/l    DTR:   2+ throughout    No Babinskis  No Martinez's    No pathological reflexes    Laboratory/Radiology:     CBC with Differential:    Lab Results   Component Value Date    WBC 16.9 09/09/2021    RBC 4.81 09/09/2021    HGB 15.2 09/09/2021    HCT 45.3 09/09/2021     09/09/2021    MCV 94.2 09/09/2021    MCH 31.6 09/09/2021    MCHC 33.6 09/09/2021    RDW 12.2 09/09/2021    LYMPHOPCT 7.0 09/09/2021    MONOPCT 7.0 09/09/2021    BASOPCT 0.2 09/09/2021    MONOSABS 1.18 09/09/2021 LYMPHSABS 1.18 09/09/2021    EOSABS 0.00 09/09/2021    BASOSABS 0.00 09/09/2021     CMP:    Lab Results   Component Value Date     09/09/2021    K 3.5 09/09/2021    K 3.6 09/07/2021    CL 98 09/09/2021    CO2 27 09/09/2021    BUN 19 09/09/2021    CREATININE 1.0 09/09/2021    GFRAA >60 09/09/2021    LABGLOM 52 09/09/2021    GLUCOSE 122 09/09/2021    GLUCOSE 96 10/31/2011    PROT 7.6 09/05/2021    LABALBU 4.0 09/05/2021    LABALBU 4.0 10/31/2011    CALCIUM 8.7 09/09/2021    BILITOT 0.8 09/05/2021    ALKPHOS 91 09/05/2021    AST 27 09/05/2021    ALT 14 09/05/2021     HgBA1c:    Lab Results   Component Value Date    LABA1C 5.7 08/04/2021     FLP:    Lab Results   Component Value Date    TRIG 217 08/04/2021    HDL 67 08/04/2021    LDLCALC 148 08/04/2021    LABVLDL 29 05/16/2016     CT head 9/5: Chronic appearing large cystic collection in the left frontotemporal region with mass effect and midline shift as noted likely an arachnoid cyst or subdural hygroma     CTA head/neck: No acute intracranial abnormality. 3.4 x 12 cm arachnoid cyst along the left cerebral convexity, stable. Stable 5 mm left to right midline shift. Old lacunar infarcts in the bilateral cerebellar hemispheres. 2. Occlusion of the V3 and V4 segments of the left vertebral artery, likely related to thrombus, possibly acute. Asymmetrically decreased contrast opacification of the distal V2 segment of the left vertebral artery, likely related to distal occlusion. 3. 70% stenosis in the mid M1 segment of the left MCA. 4. Mild stenosis in the mid basilar artery and the P2 segments of the bilateral PCAs. 5. 75% focal stenosis at the origin of the left vertebral artery. 6. 80% stenosis in the proximal V2 segment of the right vertebral artery secondary to compression by osteophytes. 7. 2.1 cm left thyroid nodule. Follow-up recommendation is listed below.  8. Prominence of the bilateral aryepiglottic folds with asymmetric decreased size of the left

## 2021-09-10 NOTE — PROGRESS NOTES
Physical Therapy Treatment Note     Name: Tereso Iglesias  : 1929  MRN: [id number]      Date of Service: 9/10/2021    Evaluating PT:  Nhan Solis, PT, DPT [id number]      Room #:  4846/4411-Q  Diagnosis:  Intracranial space-occupying lesion [R90.0]  Dizziness [R42]  Diverticulitis [K57.92]  Diverticulitis of colon [K57.32]  PMHx/PSHx:  GERD, Osteoporosis, Hyperlipidemia, Carpal tunnel syndrome, Left SENIA   Procedure/Surgery:  NA  Precautions:  Falls, Purewick catheter  Equipment Needs:  Has [employer]    SUBJECTIVE:    Pt's son lives with pt in a 1 story home with 1+1 stairs to enter the back door, or 4 steps and 1 rail in the front. Basement laundry with flight and 1 rail. Pt ambulated with no AD PTA. OBJECTIVE:   Initial Evaluation  Date: 2021 Treatment Date:  9/10/2021 Short Term/ Long Term   Goals   AM-PAC 6 Clicks 66/61 66/60    Was pt agreeable to Eval/treatment? Yes  Yes     Does pt have pain? Reported 8/10 left elbow and hip pain. RN aware. Reported 8/10 left side pain. Rn aware. Bed Mobility  Rolling: SBA  Supine to sit: Min A  Sit to supine: Min A  Scooting: SBA Rolling: SBA  Supine to sit: Min A  Sit to supine: Min A  Scooting: SBA Rolling: Supervision  Supine to sit: Supervision  Sit to supine: Supervision  Scooting: Supervision   Transfers Sit to stand: Min A  Stand to sit: Min A  Stand pivot: Mod A Sit to stand: Min A  Stand to sit: Min A  Stand pivot: Mod A Sit to stand: SBA  Stand to sit: SBA  Stand pivot: SBA   Ambulation    20 feet x 2 with [employer] with Mod A 20 feet x 2 with [employer] with Mod A >150 feet with AAD with SBA   Stair negotiation: ascended and descended  NT NT 4 steps with 1 rail with SBA   ROM BUE:  WFL  BLE:  WFL     Strength BUE:  4/5  BLE:  4/5  Increase strength 1/3 grade.    Balance Sitting EOB:  CGA to Min A  Dynamic Standing:  Min/Mod A Sitting EOB:  CGA to SBA  Dynamic Standing: Min/Mod A Sitting EOB:  Supervision  Dynamic Standing:  SBA     Pt is A & O x 3  Sensation:  Pt denies numbness and tingling to extremities  Edema:  None noted    Vitals:  Blood Pressure at rest - Blood Pressure post session -   Heart Rate at rest - Heart Rate post session -   SPO2 at rest - SPO2 post session -     Therapeutic Exercises:  STS x 4, LAQs, APs 10x ea    Patient education  Pt educated on importance of mobility, safety with mobility, transfers, gait, use of AD for safety    Patient response to education:   Pt verbalized understanding Pt demonstrated skill Pt requires further education in this area   Yes  Yes with verbal cues and assist Yes/Reinforcement     ASSESSMENT:    Conditions Requiring Skilled Therapeutic Intervention:     [x]Decreased strength     []Decreased ROM  [x]Decreased functional mobility  [x]Decreased balance   [x]Decreased endurance   [x]Decreased posture  []Decreased sensation  [x]Decreased coordination   []Decreased vision  [x]Decreased safety awareness   [x]Increased pain       Comments:  Patient cleared by RN and agreeable to treatment. Patient found in mid Chandler's and reported feeling dizzy and with left side pain. Patient reported RN aware of pain. Patient assisted to seated EOB and required assist with seated balance due to dizziness and retropulsive lean. Patient described dizziness as moderate and further described as diplopia. Patient assisted to standing and moderately unsteady/shaky on her feet. Patient required assist with balance as well as walker management/approximation. Patient with slow gait speed, short/choppy stride length and voiced fear of falling. Patient assisted on/off the commode and independent with hygiene. Patient not willing to ambulate farther due to dizziness and assisted back to the bedside. Patient assisted to seated EOB and then to supine with call light and tray table in reach. HOB elevated to comfort.     Treatment:  Patient practiced and was instructed in the following treatment:    · Bed mobility: Verbal/tactile cues for sequencing BUEs/BLEs for safe technique with rolling/supine<>sit task. · Transfer training: Verbal/tactile cues to facilitate proper hand placement, technique and safety during sit to stand task. · Gait training: Verbal and tactile cues to facilitate upright posture and safety as well as provided with physical assistance to complete task.    · Therapeutic exercises: As noted above  · Neuromuscular education: NT    PHYSICAL THERAPY PLAN OF CARE:    PT POC is established based on physician order and patient diagnosis     Referring provider/PT Order:    09/06/21 1215  PT eval and treat Start: 09/06/21 1215, End: 09/06/21 1215, ONE TIME, Standing Count: 1 Occurrences, R      April Werner, APRN - CNP       Diagnosis:  Intracranial space-occupying lesion [R90.0]  Dizziness [R42]  Diverticulitis [K57.92]  Diverticulitis of colon [K57.32]  Specific instructions for next treatment:  Subsequent PT sessions with focus on improved mobility, ambulation, safety with walker use    Current Treatment Recommendations:     [x] Strengthening to improve independence with functional mobility   [] ROM to improve independence with functional mobility   [x] Balance Training to improve static/dynamic balance and to reduce fall risk  [x] Endurance Training to improve activity tolerance during functional mobility   [x] Transfer Training to improve safety and independence with all functional transfers   [x] Gait Training to improve gait mechanics, endurance and asses need for appropriate assistive device  [x] Stair Training in preparation for safe discharge home and/or into the community   [x] Positioning to prevent skin breakdown and contractures  [x] Safety and Education Training   [x] Patient/Caregiver Education   [] HEP  [] Other     Time in  1430  Time out  1454    Total Treatment Time 24 minutes     CPT codes:  [] Low Complexity PT evaluation 45970  [] Moderate Complexity PT evaluation 54732  [] High Complexity PT evaluation [id number]  [] PT

## 2021-09-11 PROCEDURE — 2580000003 HC RX 258: Performed by: INTERNAL MEDICINE

## 2021-09-11 PROCEDURE — 6370000000 HC RX 637 (ALT 250 FOR IP): Performed by: INTERNAL MEDICINE

## 2021-09-11 PROCEDURE — 6360000002 HC RX W HCPCS: Performed by: INTERNAL MEDICINE

## 2021-09-11 PROCEDURE — 6370000000 HC RX 637 (ALT 250 FOR IP): Performed by: NURSE PRACTITIONER

## 2021-09-11 PROCEDURE — 2060000000 HC ICU INTERMEDIATE R&B

## 2021-09-11 PROCEDURE — 2580000003 HC RX 258: Performed by: NURSE PRACTITIONER

## 2021-09-11 PROCEDURE — 6360000002 HC RX W HCPCS: Performed by: NURSE PRACTITIONER

## 2021-09-11 RX ORDER — DIAZEPAM 2 MG/1
2 TABLET ORAL 2 TIMES DAILY
Status: DISCONTINUED | OUTPATIENT
Start: 2021-09-11 | End: 2021-09-13 | Stop reason: HOSPADM

## 2021-09-11 RX ADMIN — LATANOPROST 1 DROP: 50 SOLUTION OPHTHALMIC at 22:18

## 2021-09-11 RX ADMIN — ENOXAPARIN SODIUM 40 MG: 40 INJECTION SUBCUTANEOUS at 08:31

## 2021-09-11 RX ADMIN — PIPERACILLIN AND TAZOBACTAM 3375 MG: 3; .375 INJECTION, POWDER, LYOPHILIZED, FOR SOLUTION INTRAVENOUS at 22:18

## 2021-09-11 RX ADMIN — DIAZEPAM 2 MG: 2 TABLET ORAL at 13:26

## 2021-09-11 RX ADMIN — PIPERACILLIN AND TAZOBACTAM 3375 MG: 3; .375 INJECTION, POWDER, LYOPHILIZED, FOR SOLUTION INTRAVENOUS at 05:10

## 2021-09-11 RX ADMIN — MECLIZINE 25 MG: 12.5 TABLET ORAL at 18:06

## 2021-09-11 RX ADMIN — Medication 10 ML: at 08:31

## 2021-09-11 RX ADMIN — ESTROGENS, CONJUGATED 0.3 MG: 0.3 TABLET, FILM COATED ORAL at 08:30

## 2021-09-11 RX ADMIN — Medication 10 ML: at 22:29

## 2021-09-11 RX ADMIN — ASPIRIN 81 MG CHEWABLE TABLET 81 MG: 81 TABLET CHEWABLE at 08:31

## 2021-09-11 RX ADMIN — MECLIZINE 25 MG: 12.5 TABLET ORAL at 23:40

## 2021-09-11 RX ADMIN — HYDROCODONE BITARTRATE AND ACETAMINOPHEN 1 TABLET: 5; 325 TABLET ORAL at 05:10

## 2021-09-11 RX ADMIN — ATORVASTATIN CALCIUM 10 MG: 10 TABLET, FILM COATED ORAL at 22:18

## 2021-09-11 RX ADMIN — CLOPIDOGREL 75 MG: 75 TABLET, FILM COATED ORAL at 08:30

## 2021-09-11 RX ADMIN — HYDROCODONE BITARTRATE AND ACETAMINOPHEN 1 TABLET: 5; 325 TABLET ORAL at 11:56

## 2021-09-11 RX ADMIN — SODIUM CHLORIDE 25 ML: 9 INJECTION, SOLUTION INTRAVENOUS at 11:56

## 2021-09-11 RX ADMIN — AMLODIPINE BESYLATE 5 MG: 5 TABLET ORAL at 08:31

## 2021-09-11 RX ADMIN — TIMOLOL MALEATE 1 DROP: 5 SOLUTION OPHTHALMIC at 08:31

## 2021-09-11 RX ADMIN — PIPERACILLIN AND TAZOBACTAM 3375 MG: 3; .375 INJECTION, POWDER, LYOPHILIZED, FOR SOLUTION INTRAVENOUS at 13:29

## 2021-09-11 RX ADMIN — MECLIZINE 25 MG: 12.5 TABLET ORAL at 05:10

## 2021-09-11 RX ADMIN — MECLIZINE 25 MG: 12.5 TABLET ORAL at 13:27

## 2021-09-11 RX ADMIN — SODIUM CHLORIDE 25 ML: 9 INJECTION, SOLUTION INTRAVENOUS at 17:30

## 2021-09-11 RX ADMIN — SODIUM CHLORIDE 25 ML: 9 INJECTION, SOLUTION INTRAVENOUS at 00:45

## 2021-09-11 ASSESSMENT — PAIN DESCRIPTION - PAIN TYPE: TYPE: ACUTE PAIN

## 2021-09-11 ASSESSMENT — PAIN SCALES - GENERAL
PAINLEVEL_OUTOF10: 7
PAINLEVEL_OUTOF10: 8
PAINLEVEL_OUTOF10: 8
PAINLEVEL_OUTOF10: 0
PAINLEVEL_OUTOF10: 8

## 2021-09-11 ASSESSMENT — PAIN DESCRIPTION - DESCRIPTORS: DESCRIPTORS: ACHING;CONSTANT

## 2021-09-11 ASSESSMENT — PAIN DESCRIPTION - LOCATION
LOCATION: ARM
LOCATION: ARM

## 2021-09-11 ASSESSMENT — PAIN DESCRIPTION - PROGRESSION: CLINICAL_PROGRESSION: NOT CHANGED

## 2021-09-11 ASSESSMENT — PAIN DESCRIPTION - ORIENTATION
ORIENTATION: LEFT
ORIENTATION: LEFT

## 2021-09-11 ASSESSMENT — PAIN DESCRIPTION - FREQUENCY: FREQUENCY: CONTINUOUS

## 2021-09-11 NOTE — PLAN OF CARE
Neuro plan of care note:    Echo still pending (from 9/8). No new documented neuro events since assessment yesterday afternoon.     Plan:    Len Sutton to defer echo to OP if it delays discharge    Neuro will follow up results tomorrow if pt still here    Rest per last progress note    Jacoby Faustin, APRN - CNP  9:42 AM

## 2021-09-11 NOTE — PROGRESS NOTES
Attempted to ambulate patient as ordered. Patient stated that she is experiencing double vision and was unable to ambulate even from the bed to the chair without maximum assistance.

## 2021-09-11 NOTE — PLAN OF CARE
Problem: Falls - Risk of:  Goal: Will remain free from falls  Description: Will remain free from falls  9/11/2021 1516 by David Kerr RN  Outcome: Met This Shift  9/11/2021 0345 by Clyde Frye RN  Outcome: Met This Shift  Goal: Absence of physical injury  Description: Absence of physical injury  9/11/2021 1516 by David Kerr RN  Outcome: Met This Shift  9/11/2021 0345 by Clyde Frye RN  Outcome: Met This Shift     Problem: Skin Integrity:  Goal: Will show no infection signs and symptoms  Description: Will show no infection signs and symptoms  9/11/2021 1516 by David Kerr RN  Outcome: Met This Shift  9/11/2021 0345 by Clyde Frye RN  Outcome: Met This Shift  Goal: Absence of new skin breakdown  Description: Absence of new skin breakdown  9/11/2021 1516 by David Kerr RN  Outcome: Met This Shift  9/11/2021 0345 by Clyde Frye RN  Outcome: Met This Shift     Problem: Pain:  Goal: Pain level will decrease  Description: Pain level will decrease  9/11/2021 1516 by David Kerr RN  Outcome: Met This Shift  9/11/2021 0345 by Clyde Frye RN  Outcome: Met This Shift  Goal: Control of acute pain  Description: Control of acute pain  9/11/2021 0345 by Clyde Frye RN  Outcome: Met This Shift

## 2021-09-12 LAB
ANION GAP SERPL CALCULATED.3IONS-SCNC: 12 MMOL/L (ref 7–16)
BASOPHILS ABSOLUTE: 0.03 E9/L (ref 0–0.2)
BASOPHILS RELATIVE PERCENT: 0.2 % (ref 0–2)
BUN BLDV-MCNC: 31 MG/DL (ref 6–23)
BURR CELLS: ABNORMAL
CALCIUM SERPL-MCNC: 8.1 MG/DL (ref 8.6–10.2)
CHLORIDE BLD-SCNC: 97 MMOL/L (ref 98–107)
CO2: 27 MMOL/L (ref 22–29)
CREAT SERPL-MCNC: 1.6 MG/DL (ref 0.5–1)
EOSINOPHILS ABSOLUTE: 0.04 E9/L (ref 0.05–0.5)
EOSINOPHILS RELATIVE PERCENT: 0.3 % (ref 0–6)
GFR AFRICAN AMERICAN: 36
GFR NON-AFRICAN AMERICAN: 30 ML/MIN/1.73
GLUCOSE BLD-MCNC: 144 MG/DL (ref 74–99)
HCT VFR BLD CALC: 38.8 % (ref 34–48)
HEMOGLOBIN: 13.1 G/DL (ref 11.5–15.5)
IMMATURE GRANULOCYTES #: 0.08 E9/L
IMMATURE GRANULOCYTES %: 0.5 % (ref 0–5)
LYMPHOCYTES ABSOLUTE: 1.25 E9/L (ref 1.5–4)
LYMPHOCYTES RELATIVE PERCENT: 7.9 % (ref 20–42)
MCH RBC QN AUTO: 31.5 PG (ref 26–35)
MCHC RBC AUTO-ENTMCNC: 33.8 % (ref 32–34.5)
MCV RBC AUTO: 93.3 FL (ref 80–99.9)
MONOCYTES ABSOLUTE: 2.08 E9/L (ref 0.1–0.95)
MONOCYTES RELATIVE PERCENT: 13.1 % (ref 2–12)
NEUTROPHILS ABSOLUTE: 12.36 E9/L (ref 1.8–7.3)
NEUTROPHILS RELATIVE PERCENT: 78 % (ref 43–80)
OVALOCYTES: ABNORMAL
PDW BLD-RTO: 12.4 FL (ref 11.5–15)
PLATELET # BLD: 224 E9/L (ref 130–450)
PMV BLD AUTO: 9.7 FL (ref 7–12)
POIKILOCYTES: ABNORMAL
POLYCHROMASIA: ABNORMAL
POTASSIUM SERPL-SCNC: 3.1 MMOL/L (ref 3.5–5)
RBC # BLD: 4.16 E12/L (ref 3.5–5.5)
SODIUM BLD-SCNC: 136 MMOL/L (ref 132–146)
WBC # BLD: 15.8 E9/L (ref 4.5–11.5)

## 2021-09-12 PROCEDURE — 2580000003 HC RX 258: Performed by: NURSE PRACTITIONER

## 2021-09-12 PROCEDURE — 80048 BASIC METABOLIC PNL TOTAL CA: CPT

## 2021-09-12 PROCEDURE — 6370000000 HC RX 637 (ALT 250 FOR IP): Performed by: NURSE PRACTITIONER

## 2021-09-12 PROCEDURE — 6360000002 HC RX W HCPCS: Performed by: INTERNAL MEDICINE

## 2021-09-12 PROCEDURE — 6370000000 HC RX 637 (ALT 250 FOR IP): Performed by: INTERNAL MEDICINE

## 2021-09-12 PROCEDURE — 2060000000 HC ICU INTERMEDIATE R&B

## 2021-09-12 PROCEDURE — 36415 COLL VENOUS BLD VENIPUNCTURE: CPT

## 2021-09-12 PROCEDURE — 6360000002 HC RX W HCPCS: Performed by: NURSE PRACTITIONER

## 2021-09-12 PROCEDURE — 2580000003 HC RX 258: Performed by: INTERNAL MEDICINE

## 2021-09-12 PROCEDURE — 85025 COMPLETE CBC W/AUTO DIFF WBC: CPT

## 2021-09-12 RX ORDER — POTASSIUM CHLORIDE 20 MEQ/1
40 TABLET, EXTENDED RELEASE ORAL ONCE
Status: COMPLETED | OUTPATIENT
Start: 2021-09-12 | End: 2021-09-12

## 2021-09-12 RX ADMIN — AMLODIPINE BESYLATE 5 MG: 5 TABLET ORAL at 09:18

## 2021-09-12 RX ADMIN — ACETAMINOPHEN 650 MG: 325 TABLET ORAL at 05:33

## 2021-09-12 RX ADMIN — POTASSIUM CHLORIDE 40 MEQ: 1500 TABLET, EXTENDED RELEASE ORAL at 18:19

## 2021-09-12 RX ADMIN — DIAZEPAM 2 MG: 2 TABLET ORAL at 09:18

## 2021-09-12 RX ADMIN — LATANOPROST 1 DROP: 50 SOLUTION OPHTHALMIC at 20:44

## 2021-09-12 RX ADMIN — SODIUM CHLORIDE 25 ML: 9 INJECTION, SOLUTION INTRAVENOUS at 13:27

## 2021-09-12 RX ADMIN — PIPERACILLIN AND TAZOBACTAM 3375 MG: 3; .375 INJECTION, POWDER, LYOPHILIZED, FOR SOLUTION INTRAVENOUS at 20:43

## 2021-09-12 RX ADMIN — CLOPIDOGREL 75 MG: 75 TABLET, FILM COATED ORAL at 09:18

## 2021-09-12 RX ADMIN — MECLIZINE 25 MG: 12.5 TABLET ORAL at 05:33

## 2021-09-12 RX ADMIN — ASPIRIN 81 MG CHEWABLE TABLET 81 MG: 81 TABLET CHEWABLE at 09:18

## 2021-09-12 RX ADMIN — PIPERACILLIN AND TAZOBACTAM 3375 MG: 3; .375 INJECTION, POWDER, LYOPHILIZED, FOR SOLUTION INTRAVENOUS at 05:34

## 2021-09-12 RX ADMIN — ENOXAPARIN SODIUM 40 MG: 40 INJECTION SUBCUTANEOUS at 09:17

## 2021-09-12 RX ADMIN — HYDROCODONE BITARTRATE AND ACETAMINOPHEN 1 TABLET: 5; 325 TABLET ORAL at 18:19

## 2021-09-12 RX ADMIN — MECLIZINE 25 MG: 12.5 TABLET ORAL at 18:19

## 2021-09-12 RX ADMIN — PIPERACILLIN AND TAZOBACTAM 3375 MG: 3; .375 INJECTION, POWDER, LYOPHILIZED, FOR SOLUTION INTRAVENOUS at 13:30

## 2021-09-12 RX ADMIN — ESTROGENS, CONJUGATED 0.3 MG: 0.3 TABLET, FILM COATED ORAL at 09:17

## 2021-09-12 RX ADMIN — TIMOLOL MALEATE 1 DROP: 5 SOLUTION OPHTHALMIC at 09:18

## 2021-09-12 RX ADMIN — ATORVASTATIN CALCIUM 10 MG: 10 TABLET, FILM COATED ORAL at 20:44

## 2021-09-12 RX ADMIN — DIAZEPAM 2 MG: 2 TABLET ORAL at 17:23

## 2021-09-12 RX ADMIN — MECLIZINE 25 MG: 12.5 TABLET ORAL at 12:53

## 2021-09-12 RX ADMIN — Medication 10 ML: at 09:20

## 2021-09-12 RX ADMIN — HYDROCODONE BITARTRATE AND ACETAMINOPHEN 1 TABLET: 5; 325 TABLET ORAL at 09:19

## 2021-09-12 RX ADMIN — SODIUM CHLORIDE 25 ML: 9 INJECTION, SOLUTION INTRAVENOUS at 17:25

## 2021-09-12 RX ADMIN — Medication 5 ML: at 20:44

## 2021-09-12 ASSESSMENT — PAIN DESCRIPTION - PAIN TYPE
TYPE: ACUTE PAIN
TYPE: ACUTE PAIN

## 2021-09-12 ASSESSMENT — PAIN SCALES - GENERAL
PAINLEVEL_OUTOF10: 0
PAINLEVEL_OUTOF10: 8
PAINLEVEL_OUTOF10: 10
PAINLEVEL_OUTOF10: 10

## 2021-09-12 ASSESSMENT — PAIN DESCRIPTION - PROGRESSION
CLINICAL_PROGRESSION: NOT CHANGED
CLINICAL_PROGRESSION: NOT CHANGED

## 2021-09-12 ASSESSMENT — PAIN DESCRIPTION - ONSET: ONSET: ON-GOING

## 2021-09-12 ASSESSMENT — PAIN DESCRIPTION - FREQUENCY: FREQUENCY: CONTINUOUS

## 2021-09-12 ASSESSMENT — PAIN DESCRIPTION - LOCATION
LOCATION: HIP
LOCATION: RIB CAGE

## 2021-09-12 ASSESSMENT — PAIN DESCRIPTION - ORIENTATION
ORIENTATION: LEFT
ORIENTATION: LEFT

## 2021-09-12 ASSESSMENT — PAIN DESCRIPTION - DESCRIPTORS
DESCRIPTORS: CONSTANT;DISCOMFORT;DULL
DESCRIPTORS: DISCOMFORT

## 2021-09-12 NOTE — PROGRESS NOTES
Subjective:    Still feels pretty dizzy  No acute complaints otherwise  Objective:    /60   Pulse 90   Temp 98.2 °F (36.8 °C) (Temporal)   Resp 14   Ht 5' 3\" (1.6 m)   Wt 159 lb (72.1 kg)   SpO2 95%   BMI 28.17 kg/m²     In: -   Out: 600   In: -   Out: 600 [Urine:600]    General appearance: NAD, conversant  HEENT: AT/NC, MMM  Neck: FROM, supple  Lungs: Clear to auscultation  CV: RRR, no MRGs  Vasc: Radial pulses 2+  Abdomen: Soft, non-tender; no masses or HSM  Extremities: No peripheral edema or digital cyanosis  Skin: no rash, lesions or ulcers  Psych: Alert and oriented to person, place and time  Neuro: Alert and interactive     No results for input(s): WBC, HGB, HCT, PLT in the last 72 hours. No results for input(s): NA, K, CL, CO2, BUN, CREATININE, CALCIUM in the last 72 hours.     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 difficile secondary to no bowel movement  She has no abdominal pain at all  Start probiotic  Continue IV fluid hydration  Pain control  For now trial Antivert as well for ongoing dizziness, increased dose to 25 every 6 scheduled along with consideration for Valium  Statin reintroduced at a lower dose by neurology        CT cervical spine performed for trauma purposes inadvertently showed a large left frontotemporal cystic lesion-possible hygroma  Not sure if this is even amenable to surgical intervention  Neurosurgery evaluation for evaluation and input regarding above- appreciated - no plans for intervention   Neurology also following for dizziness  CTA head reveals stenosis in several cerebral arteries  Aspirin/Plavix/statin per neurology  Discontinue lactated Ringer's     Leukocytosis  Afebrile  Negative UA/urine culture  Check chest x-ray today 9/9/2021  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     Uncontrolled hypertension  Add Norvasc 5 mg daily        DVT Prophylaxis   PT/OT  Discharge planning           Jose Enrique Parks MD  9/10/2021

## 2021-09-12 NOTE — PLAN OF CARE
Notes reviewed. Agree with low dose Valium for her vertigo    Continue to work with therapy.      Echo is still pending-- does not need to delay discharge

## 2021-09-12 NOTE — PLAN OF CARE
Problem: Falls - Risk of:  Goal: Will remain free from falls  Description: Will remain free from falls  9/12/2021 0350 by Katey Gil RN  Outcome: Met This Shift  9/11/2021 1516 by Agapito Valle RN  Outcome: Met This Shift  Goal: Absence of physical injury  Description: Absence of physical injury  9/12/2021 0350 by Katey Gil RN  Outcome: Met This Shift  9/11/2021 1516 by Agapito Valle RN  Outcome: Met This Shift     Problem: Skin Integrity:  Goal: Will show no infection signs and symptoms  Description: Will show no infection signs and symptoms  9/12/2021 0350 by Katey Gil RN  Outcome: Met This Shift  9/11/2021 1516 by Agapito Valle RN  Outcome: Met This Shift  Goal: Absence of new skin breakdown  Description: Absence of new skin breakdown  9/12/2021 0350 by Katey Gil RN  Outcome: Met This Shift  9/11/2021 1516 by Agapito Valle RN  Outcome: Met This Shift     Problem: Pain:  Goal: Pain level will decrease  Description: Pain level will decrease  9/12/2021 0350 by Katey Gil RN  Outcome: Met This Shift  9/11/2021 1516 by Agapito Valle RN  Outcome: Met This Shift  Goal: Control of acute pain  Description: Control of acute pain  Outcome: Met This Shift  Goal: Control of chronic pain  Description: Control of chronic pain  Outcome: Met This Shift

## 2021-09-12 NOTE — PROGRESS NOTES
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     Uncontrolled hypertension  Add Norvasc 5 mg daily        DVT Prophylaxis   PT/OT  Discharge planning           Mary Alice Parnell MD  9/11/2021

## 2021-09-12 NOTE — PLAN OF CARE
Problem: Falls - Risk of:  Goal: Will remain free from falls  Description: Will remain free from falls  9/12/2021 1528 by Lorena Ahmadi RN  Outcome: Met This Shift  9/12/2021 0350 by Vish Jeff RN  Outcome: Met This Shift  Goal: Absence of physical injury  Description: Absence of physical injury  9/12/2021 1528 by Lorena Ahmadi RN  Outcome: Met This Shift  9/12/2021 0350 by Vish Jeff RN  Outcome: Met This Shift     Problem: Skin Integrity:  Goal: Will show no infection signs and symptoms  Description: Will show no infection signs and symptoms  9/12/2021 1528 by Lorena Ahmadi RN  Outcome: Met This Shift  9/12/2021 0350 by Vish Jeff RN  Outcome: Met This Shift  Goal: Absence of new skin breakdown  Description: Absence of new skin breakdown  9/12/2021 1528 by Lorena Ahmadi RN  Outcome: Met This Shift  9/12/2021 0350 by Vish Jeff RN  Outcome: Met This Shift     Problem: Pain:  Goal: Pain level will decrease  Description: Pain level will decrease  9/12/2021 1528 by Lorena Ahmadi RN  Outcome: Met This Shift  9/12/2021 0350 by Vish Jeff RN  Outcome: Met This Shift  Goal: Control of acute pain  Description: Control of acute pain  9/12/2021 1528 by Lorena Ahmadi RN  Outcome: Met This Shift  9/12/2021 0350 by Vish Jeff RN  Outcome: Met This Shift  Goal: Control of chronic pain  Description: Control of chronic pain  9/12/2021 1528 by Lorena Ahmadi RN  Outcome: Met This Shift  9/12/2021 0350 by Vish Jeff RN  Outcome: Met This Shift

## 2021-09-12 NOTE — PROGRESS NOTES
Subjective:    She is still quite dizzy    Objective:    /60   Pulse 90   Temp 98.2 °F (36.8 °C) (Temporal)   Resp 14   Ht 5' 3\" (1.6 m)   Wt 159 lb (72.1 kg)   SpO2 95%   BMI 28.17 kg/m²     In: -   Out: 600   In: -   Out: 600 [Urine:600]    General appearance: NAD, conversant  HEENT: AT/NC, MMM  Neck: FROM, supple  Lungs: Clear to auscultation  CV: RRR, no MRGs  Vasc: Radial pulses 2+  Abdomen: Soft, non-tender; no masses or HSM  Extremities: No peripheral edema or digital cyanosis  Skin: no rash, lesions or ulcers  Psych: Alert and oriented to person, place and time  Neuro: Alert and interactive     No results for input(s): WBC, HGB, HCT, PLT in the last 72 hours. No results for input(s): NA, K, CL, CO2, BUN, CREATININE, CALCIUM in the last 72 hours.     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  Not sure if this is even amenable to surgical intervention  Neurosurgery evaluation for evaluation and input regarding above- appreciated - no plans for intervention   Neurology also following for dizziness  CTA head reveals stenosis in several cerebral arteries  Aspirin/Plavix/statin per neurology  Discontinue lactated Ringer's      Leukocytosis  Afebrile  Negative UA/urine culture   Check chest x-ray 9/9/2021-unremarkable  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     Uncontrolled hypertension  Add Norvasc 5 mg daily        DVT Prophylaxis   PT/OT  Discharge planning           Albertina Ontiveros MD  9/11/2021

## 2021-09-13 VITALS
TEMPERATURE: 97.6 F | HEART RATE: 90 BPM | HEIGHT: 63 IN | RESPIRATION RATE: 18 BRPM | WEIGHT: 158 LBS | OXYGEN SATURATION: 96 % | BODY MASS INDEX: 28 KG/M2 | SYSTOLIC BLOOD PRESSURE: 143 MMHG | DIASTOLIC BLOOD PRESSURE: 71 MMHG

## 2021-09-13 LAB
ANION GAP SERPL CALCULATED.3IONS-SCNC: 8 MMOL/L (ref 7–16)
BASOPHILS ABSOLUTE: 0 E9/L (ref 0–0.2)
BASOPHILS RELATIVE PERCENT: 0.2 % (ref 0–2)
BUN BLDV-MCNC: 34 MG/DL (ref 6–23)
BURR CELLS: ABNORMAL
CALCIUM SERPL-MCNC: 8.5 MG/DL (ref 8.6–10.2)
CHLORIDE BLD-SCNC: 99 MMOL/L (ref 98–107)
CO2: 29 MMOL/L (ref 22–29)
CREAT SERPL-MCNC: 1.8 MG/DL (ref 0.5–1)
EOSINOPHILS ABSOLUTE: 0 E9/L (ref 0.05–0.5)
EOSINOPHILS RELATIVE PERCENT: 0.4 % (ref 0–6)
GFR AFRICAN AMERICAN: 32
GFR NON-AFRICAN AMERICAN: 26 ML/MIN/1.73
GLUCOSE BLD-MCNC: 158 MG/DL (ref 74–99)
HCT VFR BLD CALC: 39.6 % (ref 34–48)
HEMOGLOBIN: 13.1 G/DL (ref 11.5–15.5)
LV EF: 58 %
LVEF MODALITY: NORMAL
LYMPHOCYTES ABSOLUTE: 0.97 E9/L (ref 1.5–4)
LYMPHOCYTES RELATIVE PERCENT: 7 % (ref 20–42)
MAGNESIUM: 2 MG/DL (ref 1.6–2.6)
MCH RBC QN AUTO: 31.6 PG (ref 26–35)
MCHC RBC AUTO-ENTMCNC: 33.1 % (ref 32–34.5)
MCV RBC AUTO: 95.4 FL (ref 80–99.9)
MONOCYTES ABSOLUTE: 1.24 E9/L (ref 0.1–0.95)
MONOCYTES RELATIVE PERCENT: 8.7 % (ref 2–12)
NEUTROPHILS ABSOLUTE: 11.59 E9/L (ref 1.8–7.3)
NEUTROPHILS RELATIVE PERCENT: 84.3 % (ref 43–80)
OVALOCYTES: ABNORMAL
PDW BLD-RTO: 12.5 FL (ref 11.5–15)
PLATELET # BLD: 231 E9/L (ref 130–450)
PMV BLD AUTO: 9.8 FL (ref 7–12)
POIKILOCYTES: ABNORMAL
POLYCHROMASIA: ABNORMAL
POTASSIUM REFLEX MAGNESIUM: 3 MMOL/L (ref 3.5–5)
RBC # BLD: 4.15 E12/L (ref 3.5–5.5)
SARS-COV-2, NAAT: NOT DETECTED
SODIUM BLD-SCNC: 136 MMOL/L (ref 132–146)
WBC # BLD: 13.8 E9/L (ref 4.5–11.5)

## 2021-09-13 PROCEDURE — 6360000002 HC RX W HCPCS: Performed by: INTERNAL MEDICINE

## 2021-09-13 PROCEDURE — 6370000000 HC RX 637 (ALT 250 FOR IP): Performed by: INTERNAL MEDICINE

## 2021-09-13 PROCEDURE — 83735 ASSAY OF MAGNESIUM: CPT

## 2021-09-13 PROCEDURE — 87635 SARS-COV-2 COVID-19 AMP PRB: CPT

## 2021-09-13 PROCEDURE — 85025 COMPLETE CBC W/AUTO DIFF WBC: CPT

## 2021-09-13 PROCEDURE — 6360000002 HC RX W HCPCS: Performed by: NURSE PRACTITIONER

## 2021-09-13 PROCEDURE — 36415 COLL VENOUS BLD VENIPUNCTURE: CPT

## 2021-09-13 PROCEDURE — 2580000003 HC RX 258: Performed by: INTERNAL MEDICINE

## 2021-09-13 PROCEDURE — 80048 BASIC METABOLIC PNL TOTAL CA: CPT

## 2021-09-13 PROCEDURE — 2580000003 HC RX 258: Performed by: NURSE PRACTITIONER

## 2021-09-13 PROCEDURE — 6370000000 HC RX 637 (ALT 250 FOR IP): Performed by: NURSE PRACTITIONER

## 2021-09-13 PROCEDURE — 93306 TTE W/DOPPLER COMPLETE: CPT

## 2021-09-13 RX ORDER — CLOPIDOGREL BISULFATE 75 MG/1
75 TABLET ORAL DAILY
Qty: 30 TABLET | Refills: 3 | Status: SHIPPED | OUTPATIENT
Start: 2021-09-14 | End: 2022-01-24 | Stop reason: SDUPTHER

## 2021-09-13 RX ORDER — ATORVASTATIN CALCIUM 10 MG/1
10 TABLET, FILM COATED ORAL NIGHTLY
Qty: 30 TABLET | Refills: 3 | Status: SHIPPED | OUTPATIENT
Start: 2021-09-13 | End: 2021-12-13 | Stop reason: SDUPTHER

## 2021-09-13 RX ORDER — MECLIZINE HYDROCHLORIDE 25 MG/1
25 TABLET ORAL 4 TIMES DAILY
Qty: 20 TABLET | Refills: 0 | Status: SHIPPED | OUTPATIENT
Start: 2021-09-13 | End: 2021-09-18

## 2021-09-13 RX ORDER — DIAZEPAM 2 MG/1
2 TABLET ORAL 2 TIMES DAILY
Qty: 4 TABLET | Refills: 0 | Status: SHIPPED | OUTPATIENT
Start: 2021-09-13 | End: 2021-09-15

## 2021-09-13 RX ORDER — AMLODIPINE BESYLATE 5 MG/1
5 TABLET ORAL DAILY
Qty: 30 TABLET | Refills: 3 | Status: SHIPPED | OUTPATIENT
Start: 2021-09-14 | End: 2021-12-13 | Stop reason: SDUPTHER

## 2021-09-13 RX ORDER — POTASSIUM CHLORIDE 20 MEQ/1
40 TABLET, EXTENDED RELEASE ORAL ONCE
Status: COMPLETED | OUTPATIENT
Start: 2021-09-13 | End: 2021-09-13

## 2021-09-13 RX ORDER — ASPIRIN 81 MG/1
81 TABLET, CHEWABLE ORAL DAILY
Qty: 30 TABLET | Refills: 3 | Status: SHIPPED | OUTPATIENT
Start: 2021-09-14

## 2021-09-13 RX ADMIN — ASPIRIN 81 MG CHEWABLE TABLET 81 MG: 81 TABLET CHEWABLE at 09:37

## 2021-09-13 RX ADMIN — POTASSIUM CHLORIDE 40 MEQ: 1500 TABLET, EXTENDED RELEASE ORAL at 15:12

## 2021-09-13 RX ADMIN — HYDROCODONE BITARTRATE AND ACETAMINOPHEN 1 TABLET: 5; 325 TABLET ORAL at 00:37

## 2021-09-13 RX ADMIN — Medication 10 ML: at 10:18

## 2021-09-13 RX ADMIN — MECLIZINE 25 MG: 12.5 TABLET ORAL at 05:24

## 2021-09-13 RX ADMIN — SODIUM CHLORIDE 25 ML: 9 INJECTION, SOLUTION INTRAVENOUS at 10:18

## 2021-09-13 RX ADMIN — ESTROGENS, CONJUGATED 0.3 MG: 0.3 TABLET, FILM COATED ORAL at 09:36

## 2021-09-13 RX ADMIN — AMLODIPINE BESYLATE 5 MG: 5 TABLET ORAL at 09:36

## 2021-09-13 RX ADMIN — MECLIZINE 25 MG: 12.5 TABLET ORAL at 12:03

## 2021-09-13 RX ADMIN — CLOPIDOGREL 75 MG: 75 TABLET, FILM COATED ORAL at 09:37

## 2021-09-13 RX ADMIN — DIAZEPAM 2 MG: 2 TABLET ORAL at 09:36

## 2021-09-13 RX ADMIN — MECLIZINE 25 MG: 12.5 TABLET ORAL at 00:37

## 2021-09-13 RX ADMIN — TIMOLOL MALEATE 1 DROP: 5 SOLUTION OPHTHALMIC at 10:17

## 2021-09-13 RX ADMIN — PIPERACILLIN AND TAZOBACTAM 3375 MG: 3; .375 INJECTION, POWDER, LYOPHILIZED, FOR SOLUTION INTRAVENOUS at 05:25

## 2021-09-13 RX ADMIN — ENOXAPARIN SODIUM 40 MG: 40 INJECTION SUBCUTANEOUS at 09:36

## 2021-09-13 ASSESSMENT — PAIN SCALES - GENERAL
PAINLEVEL_OUTOF10: 8
PAINLEVEL_OUTOF10: 10

## 2021-09-13 ASSESSMENT — PAIN DESCRIPTION - PROGRESSION: CLINICAL_PROGRESSION: NOT CHANGED

## 2021-09-13 ASSESSMENT — PAIN DESCRIPTION - PAIN TYPE: TYPE: ACUTE PAIN

## 2021-09-13 ASSESSMENT — PAIN DESCRIPTION - LOCATION: LOCATION: HIP

## 2021-09-13 ASSESSMENT — PAIN DESCRIPTION - ONSET: ONSET: ON-GOING

## 2021-09-13 ASSESSMENT — PAIN DESCRIPTION - ORIENTATION: ORIENTATION: LEFT

## 2021-09-13 ASSESSMENT — PAIN DESCRIPTION - DESCRIPTORS: DESCRIPTORS: CONSTANT;DISCOMFORT;PRESSURE

## 2021-09-13 ASSESSMENT — PAIN DESCRIPTION - FREQUENCY: FREQUENCY: CONTINUOUS

## 2021-09-13 NOTE — PLAN OF CARE
Problem: Skin Integrity:  Goal: Absence of new skin breakdown  Description: Absence of new skin breakdown  9/13/2021 0511 by Rolando Underwood RN  Outcome: Met This Shift     Problem: Falls - Risk of:  Goal: Will remain free from falls  Description: Will remain free from falls  9/13/2021 0511 by Rolando Underwood RN  Outcome: Met This Shift     Problem: Pain:  Description: Pain management should include both nonpharmacologic and pharmacologic interventions.   Goal: Control of acute pain  Description: Control of acute pain  9/13/2021 0511 by Rolando Underwood RN  Outcome: Met This Shift

## 2021-09-13 NOTE — PROGRESS NOTES
Dr. Kendal Montoya MD,    Your patient is on a medication that requires a renal dose adjustment. Renal Function Assessment:    Date Body Weight IBW  Adjusted BW SCr  CrCl Dialysis status   9/13/2021 158 lb (71.7 kg)  Ideal body weight: 52.4 kg (115 lb 8.3 oz)  Adjusted ideal body weight: 60.1 kg (132 lb 8.2 oz) Serum creatinine: 1.6 mg/dL (H) 09/12/21 1132  Estimated creatinine clearance: 21 mL/min (A) N/a       Pharmacy has renally dose-adjusted the following medication(s):    Date Original Order Renally Adjusted Order   9/13/2021 Lovenox 40mg daily Lovenox 30mg daily       These changes were made per protocol according to the [facility] Renal Function-Based Dose Adjustments Policy    *Please note this dose may need readjusted if your patient's renal function significantly improves. Please contact pharmacy with any questions regarding these changes.     Katalina Franco, [id number] [facility]  9/13/2021  10:39 AM

## 2021-09-13 NOTE — CARE COORDINATION
HENS completed; transport set-up for 1630 by facility; updated pt at bedside, and charge [employer].

## 2021-09-13 NOTE — CARE COORDINATION
Spoke with Duyen Sánchez, pt has been accepted to [facility]. On echo list; messaged attending for potential discharge; if orthos are negative okay for discharge; will need negative covid day of discharge. Bedside RN Jenn Carlson notified for need of orthos. Will complete HENS once discharge is ordered.

## 2021-09-13 NOTE — DISCHARGE SUMMARY
Physician Discharge Summary     Patient ID:  Veronica Lindsay  [id number]  13 y.o.  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 intake  For now trial Antivert as well for ongoing dizziness, increased dose to 25 every 6 scheduled along with Valium 2 mg twice daily to see if that assists in the dizziness-dizziness improved  Statin reintroduced at a lower dose by neurology  Hallpike maneuver needs to be attempted        CT cervical spine performed for trauma purposes inadvertently showed a large left frontotemporal cystic lesion-possible hygroma  Not sure if this is even amenable to surgical intervention  Neurosurgery evaluation for evaluation and input regarding above- appreciated - no plans for intervention   Neurology also following for dizziness  CTA head reveals stenosis in several cerebral arteries  Aspirin/Plavix/statin per neurology  Discontinue lactated Ringer's      Leukocytosis-unclear etiology as patient does not have any clear signs or symptoms of sepsis  Afebrile  Negative UA/urine culture   Check chest x-ray 9/9/2021-unremarkable  CT abdomen pelvis with 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  CT chest with no PE, minimal groundglass densities in the lower lobes no focal consolidation-viral panel including Covid is negative       Uncontrolled hypertension  Add Norvasc 5 mg daily-has responded well  Okay for discharge from medicine standpoint  Repeat CBC and BMP in 3 days  Recent Labs     09/12/21  1132   WBC 15.8*   HGB 13.1   HCT 38.8          Recent Labs     09/12/21  1132      K 3.1*   CL 97*   CO2 27   BUN 31*   CREATININE 1.6*   CALCIUM 8.1*       CT HEAD WO CONTRAST    Result Date: 9/5/2021  EXAMINATION: CT OF THE HEAD WITHOUT CONTRAST; CT OF THE CHEST WITH CONTRAST; CT OF THE ABDOMEN AND PELVIS WITH CONTRAST; CT OF THE CERVICAL SPINE WITHOUT CONTRAST 9/5/2021 8:21 pm TECHNIQUE: CT of the head was performed without the administration of intravenous contrast. Dose modulation, iterative reconstruction, and/or weight based adjustment of the mA/kV was utilized to reduce the radiation dose to as low as reasonably achievable.; CT of the chest was performed with the administration of intravenous contrast. Multiplanar reformatted images are provided for review. Dose modulation, iterative reconstruction, and/or weight based adjustment of the mA/kV was utilized to reduce the radiation dose to as low as reasonably achievable.; CT of the abdomen and pelvis was performed with the administration of intravenous contrast. Multiplanar reformatted images are provided for review. Dose modulation, iterative reconstruction, and/or weight based adjustment of the mA/kV was utilized to reduce the radiation dose to as low as reasonably achievable.; CT of the cervical spine was performed without the administration of intravenous contrast. Multiplanar reformatted images are provided for review. Dose modulation, iterative reconstruction, and/or weight based adjustment of the mA/kV was utilized to reduce the radiation dose to as low as reasonably achievable. COMPARISON: None. HISTORY: ORDERING SYSTEM PROVIDED HISTORY: fall TECHNOLOGIST PROVIDED HISTORY: Has a \"code stroke\" or \"stroke alert\" been called? ->No Reason for exam:->fall Decision Support Exception - unselect if not a suspected or confirmed emergency medical condition->Emergency Medical Condition ([place]) What reading provider will be dictating this exam?->CRC FINDINGS: CT head. There is a large chronic appearing cystic collection in the left frontotemporal region measuring 12 x 3.4 cm with mass effect on the left cerebral hemisphere and lateral ventricle. There is 5 mm left-to-right midline shift. There is atrophy with dilated ventricles and prominence of the sulci. Confluent areas of decreased attenuation are present in the periventricular white matter and brainstem consistent with microvascular/ischemic changes. There is no acute stroke, mass or hemorrhage. Chronic appearing large cystic collection in the left frontotemporal region with mass effect and midline shift as noted likely an arachnoid cyst or subdural hygroma. If further imaging is clinically warranted, consider MRI. There is no acute stroke or hemorrhage. Atrophy with small vessel ischemic disease. CT cervical spine. There is no acute fracture or dislocation in the cervical spine. There is diffuse degenerative changes from C2-T1 with osteophytes and multilevel disc bulges. The prevertebral soft tissues are normal.  Degenerative pannus is identified at the craniocervical junction. Impression No acute fractures. Diffuse degenerative changes from C2-T1. CT chest. The heart and the great vessels are normal.  There is no central pulmonary embolism. Trachea and major bronchi are patent. There is minimal atelectasis/ground-glass densities in the lower lobes bilaterally and lingula. There is no focal consolidation or pneumothorax. Impression Minimal atelectasis/ground-glass densities in the lower lobes and lingula. Viral infection has to be excluded. There is no acute traumatic injury or pneumothorax. CT abdomen and pelvis. The liver is of normal architecture.   Gallbladder, spleen, pancreas, the adrenals and the kidneys are normal except for multiple cortical and peripelvic cyst in the left kidney largest 1 measuring up to 2.2 cm. Degenerative changes are identified in the lumbar spine. There is a chronic calcified dissection of the proximal abdominal aorta measuring 1.8 x 1.9 cm. Pelvis. Streak artifact from the left hip arthroplasty limits the evaluation of the pelvis. Bladder is distended. There is diverticulosis of the descending and sigmoid colon with mild thickening of the sigmoid colon which is partially collapsed. Appendix cannot be identified. Impression No acute traumatic injury or solid organ injury in the abdomen and pelvis. Diverticulosis of the left hemicolon with mild thickening of the rectosigmoid colon likely spasm or less likely mild uncomplicated diverticulitis. CT CHEST W CONTRAST    Result Date: 9/5/2021  EXAMINATION: CT OF THE HEAD WITHOUT CONTRAST; CT OF THE CHEST WITH CONTRAST; CT OF THE ABDOMEN AND PELVIS WITH CONTRAST; CT OF THE CERVICAL SPINE WITHOUT CONTRAST 9/5/2021 8:21 pm TECHNIQUE: CT of the head was performed without the administration of intravenous contrast. Dose modulation, iterative reconstruction, and/or weight based adjustment of the mA/kV was utilized to reduce the radiation dose to as low as reasonably achievable.; CT of the chest was performed with the administration of intravenous contrast. Multiplanar reformatted images are provided for review. Dose modulation, iterative reconstruction, and/or weight based adjustment of the mA/kV was utilized to reduce the radiation dose to as low as reasonably achievable.; CT of the abdomen and pelvis was performed with the administration of intravenous contrast. Multiplanar reformatted images are provided for review.  Dose modulation, iterative reconstruction, and/or weight based adjustment of the mA/kV was utilized to reduce the radiation dose to as low as reasonably achievable.; CT of the cervical spine was performed without the administration of intravenous contrast. Multiplanar reformatted images are provided for review. Dose modulation, iterative reconstruction, and/or weight based adjustment of the mA/kV was utilized to reduce the radiation dose to as low as reasonably achievable. COMPARISON: None. HISTORY: ORDERING SYSTEM PROVIDED HISTORY: fall TECHNOLOGIST PROVIDED HISTORY: Has a \"code stroke\" or \"stroke alert\" been called? ->No Reason for exam:->fall Decision Support Exception - unselect if not a suspected or confirmed emergency medical condition->Emergency Medical Condition ([place]) What reading provider will be dictating this exam?->CRC FINDINGS: CT head. There is a large chronic appearing cystic collection in the left frontotemporal region measuring 12 x 3.4 cm with mass effect on the left cerebral hemisphere and lateral ventricle. There is 5 mm left-to-right midline shift. There is atrophy with dilated ventricles and prominence of the sulci. Confluent areas of decreased attenuation are present in the periventricular white matter and brainstem consistent with microvascular/ischemic changes. There is no acute stroke, mass or hemorrhage. Chronic appearing large cystic collection in the left frontotemporal region with mass effect and midline shift as noted likely an arachnoid cyst or subdural hygroma. If further imaging is clinically warranted, consider MRI. There is no acute stroke or hemorrhage. Atrophy with small vessel ischemic disease. CT cervical spine. There is no acute fracture or dislocation in the cervical spine. There is diffuse degenerative changes from C2-T1 with osteophytes and multilevel disc bulges. The prevertebral soft tissues are normal.  Degenerative pannus is identified at the craniocervical junction. Impression No acute fractures. Diffuse degenerative changes from C2-T1. CT chest. The heart and the great vessels are normal.  There is no central pulmonary embolism. Trachea and major bronchi are patent.   There is minimal atelectasis/ground-glass densities in the lower lobes bilaterally and lingula. There is no focal consolidation or pneumothorax. Impression Minimal atelectasis/ground-glass densities in the lower lobes and lingula. Viral infection has to be excluded. There is no acute traumatic injury or pneumothorax. CT abdomen and pelvis. The liver is of normal architecture. Gallbladder, spleen, pancreas, the adrenals and the kidneys are normal except for multiple cortical and peripelvic cyst in the left kidney largest 1 measuring up to 2.2 cm. Degenerative changes are identified in the lumbar spine. There is a chronic calcified dissection of the proximal abdominal aorta measuring 1.8 x 1.9 cm. Pelvis. Streak artifact from the left hip arthroplasty limits the evaluation of the pelvis. Bladder is distended. There is diverticulosis of the descending and sigmoid colon with mild thickening of the sigmoid colon which is partially collapsed. Appendix cannot be identified. Impression No acute traumatic injury or solid organ injury in the abdomen and pelvis. Diverticulosis of the left hemicolon with mild thickening of the rectosigmoid colon likely spasm or less likely mild uncomplicated diverticulitis. CT CERVICAL SPINE WO CONTRAST    Result Date: 9/5/2021  EXAMINATION: CT OF THE HEAD WITHOUT CONTRAST; CT OF THE CHEST WITH CONTRAST; CT OF THE ABDOMEN AND PELVIS WITH CONTRAST; CT OF THE CERVICAL SPINE WITHOUT CONTRAST 9/5/2021 8:21 pm TECHNIQUE: CT of the head was performed without the administration of intravenous contrast. Dose modulation, iterative reconstruction, and/or weight based adjustment of the mA/kV was utilized to reduce the radiation dose to as low as reasonably achievable.; CT of the chest was performed with the administration of intravenous contrast. Multiplanar reformatted images are provided for review.  Dose modulation, iterative reconstruction, and/or weight based adjustment of the mA/kV was utilized to reduce the radiation dose to as low as reasonably achievable.; CT of the abdomen and pelvis was performed with the administration of intravenous contrast. Multiplanar reformatted images are provided for review. Dose modulation, iterative reconstruction, and/or weight based adjustment of the mA/kV was utilized to reduce the radiation dose to as low as reasonably achievable.; CT of the cervical spine was performed without the administration of intravenous contrast. Multiplanar reformatted images are provided for review. Dose modulation, iterative reconstruction, and/or weight based adjustment of the mA/kV was utilized to reduce the radiation dose to as low as reasonably achievable. COMPARISON: None. HISTORY: ORDERING SYSTEM PROVIDED HISTORY: fall TECHNOLOGIST PROVIDED HISTORY: Has a \"code stroke\" or \"stroke alert\" been called? ->No Reason for exam:->fall Decision Support Exception - unselect if not a suspected or confirmed emergency medical condition->Emergency Medical Condition ([place]) What reading provider will be dictating this exam?->CRC FINDINGS: CT head. There is a large chronic appearing cystic collection in the left frontotemporal region measuring 12 x 3.4 cm with mass effect on the left cerebral hemisphere and lateral ventricle. There is 5 mm left-to-right midline shift. There is atrophy with dilated ventricles and prominence of the sulci. Confluent areas of decreased attenuation are present in the periventricular white matter and brainstem consistent with microvascular/ischemic changes. There is no acute stroke, mass or hemorrhage. Chronic appearing large cystic collection in the left frontotemporal region with mass effect and midline shift as noted likely an arachnoid cyst or subdural hygroma. If further imaging is clinically warranted, consider MRI. There is no acute stroke or hemorrhage. Atrophy with small vessel ischemic disease. CT cervical spine.  There is no acute fracture or dislocation in the cervical spine.  There is diffuse degenerative changes from C2-T1 with osteophytes and multilevel disc bulges. The prevertebral soft tissues are normal.  Degenerative pannus is identified at the craniocervical junction. Impression No acute fractures. Diffuse degenerative changes from C2-T1. CT chest. The heart and the great vessels are normal.  There is no central pulmonary embolism. Trachea and major bronchi are patent. There is minimal atelectasis/ground-glass densities in the lower lobes bilaterally and lingula. There is no focal consolidation or pneumothorax. Impression Minimal atelectasis/ground-glass densities in the lower lobes and lingula. Viral infection has to be excluded. There is no acute traumatic injury or pneumothorax. CT abdomen and pelvis. The liver is of normal architecture. Gallbladder, spleen, pancreas, the adrenals and the kidneys are normal except for multiple cortical and peripelvic cyst in the left kidney largest 1 measuring up to 2.2 cm. Degenerative changes are identified in the lumbar spine. There is a chronic calcified dissection of the proximal abdominal aorta measuring 1.8 x 1.9 cm. Pelvis. Streak artifact from the left hip arthroplasty limits the evaluation of the pelvis. Bladder is distended. There is diverticulosis of the descending and sigmoid colon with mild thickening of the sigmoid colon which is partially collapsed. Appendix cannot be identified. Impression No acute traumatic injury or solid organ injury in the abdomen and pelvis. Diverticulosis of the left hemicolon with mild thickening of the rectosigmoid colon likely spasm or less likely mild uncomplicated diverticulitis.      CT ABDOMEN PELVIS W IV CONTRAST Additional Contrast? None    Result Date: 9/5/2021  EXAMINATION: CT OF THE HEAD WITHOUT CONTRAST; CT OF THE CHEST WITH CONTRAST; CT OF THE ABDOMEN AND PELVIS WITH CONTRAST; CT OF THE CERVICAL SPINE WITHOUT CONTRAST 9/5/2021 8:21 pm TECHNIQUE: CT of the head was performed without the administration of intravenous contrast. Dose modulation, iterative reconstruction, and/or weight based adjustment of the mA/kV was utilized to reduce the radiation dose to as low as reasonably achievable.; CT of the chest was performed with the administration of intravenous contrast. Multiplanar reformatted images are provided for review. Dose modulation, iterative reconstruction, and/or weight based adjustment of the mA/kV was utilized to reduce the radiation dose to as low as reasonably achievable.; CT of the abdomen and pelvis was performed with the administration of intravenous contrast. Multiplanar reformatted images are provided for review. Dose modulation, iterative reconstruction, and/or weight based adjustment of the mA/kV was utilized to reduce the radiation dose to as low as reasonably achievable.; CT of the cervical spine was performed without the administration of intravenous contrast. Multiplanar reformatted images are provided for review. Dose modulation, iterative reconstruction, and/or weight based adjustment of the mA/kV was utilized to reduce the radiation dose to as low as reasonably achievable. COMPARISON: None. HISTORY: ORDERING SYSTEM PROVIDED HISTORY: fall TECHNOLOGIST PROVIDED HISTORY: Has a \"code stroke\" or \"stroke alert\" been called? ->No Reason for exam:->fall Decision Support Exception - unselect if not a suspected or confirmed emergency medical condition->Emergency Medical Condition ([place]) What reading provider will be dictating this exam?->CRC FINDINGS: CT head. There is a large chronic appearing cystic collection in the left frontotemporal region measuring 12 x 3.4 cm with mass effect on the left cerebral hemisphere and lateral ventricle. There is 5 mm left-to-right midline shift. There is atrophy with dilated ventricles and prominence of the sulci.   Confluent areas of decreased attenuation are present in the periventricular white matter and brainstem consistent with microvascular/ischemic changes. There is no acute stroke, mass or hemorrhage. Chronic appearing large cystic collection in the left frontotemporal region with mass effect and midline shift as noted likely an arachnoid cyst or subdural hygroma. If further imaging is clinically warranted, consider MRI. There is no acute stroke or hemorrhage. Atrophy with small vessel ischemic disease. CT cervical spine. There is no acute fracture or dislocation in the cervical spine. There is diffuse degenerative changes from C2-T1 with osteophytes and multilevel disc bulges. The prevertebral soft tissues are normal.  Degenerative pannus is identified at the craniocervical junction. Impression No acute fractures. Diffuse degenerative changes from C2-T1. CT chest. The heart and the great vessels are normal.  There is no central pulmonary embolism. Trachea and major bronchi are patent. There is minimal atelectasis/ground-glass densities in the lower lobes bilaterally and lingula. There is no focal consolidation or pneumothorax. Impression Minimal atelectasis/ground-glass densities in the lower lobes and lingula. Viral infection has to be excluded. There is no acute traumatic injury or pneumothorax. CT abdomen and pelvis. The liver is of normal architecture. Gallbladder, spleen, pancreas, the adrenals and the kidneys are normal except for multiple cortical and peripelvic cyst in the left kidney largest 1 measuring up to 2.2 cm. Degenerative changes are identified in the lumbar spine. There is a chronic calcified dissection of the proximal abdominal aorta measuring 1.8 x 1.9 cm. Pelvis. Streak artifact from the left hip arthroplasty limits the evaluation of the pelvis. Bladder is distended. There is diverticulosis of the descending and sigmoid colon with mild thickening of the sigmoid colon which is partially collapsed. Appendix cannot be identified.  Impression No acute traumatic injury or solid organ injury in the abdomen and pelvis. Diverticulosis of the left hemicolon with mild thickening of the rectosigmoid colon likely spasm or less likely mild uncomplicated diverticulitis. Discharge Exam:    HEENT: NCAT,  PERRLA, No JVD  Heart:  RRR, no murmurs, gallops, or rubs. Lungs:  CTA bilaterally, no wheeze, rales or rhonchi  Abd: bowel sounds present, nontender, nondistended, no masses  Extrem:  No clubbing, cyanosis, or edema    Disposition:      Patient Condition at Discharge: Stable    Patient Instructions:      Medication List      START taking these medications    amLODIPine 5 MG tablet  Commonly known as: NORVASC  Take 1 tablet by mouth daily  Start taking on: September 14, 2021     aspirin 81 MG chewable tablet  Take 1 tablet by mouth daily  Start taking on: September 14, 2021     atorvastatin 10 MG tablet  Commonly known as: LIPITOR  Take 1 tablet by mouth nightly     clopidogrel 75 MG tablet  Commonly known as: PLAVIX  Take 1 tablet by mouth daily  Start taking on: September 14, 2021     diazePAM 2 MG tablet  Commonly known as: VALIUM  Take 1 tablet by mouth 2 times daily for 2 days.      meclizine 25 MG tablet  Commonly known as: ANTIVERT  Take 1 tablet by mouth 4 times daily for 5 days        CONTINUE taking these medications    estrogens (conjugated) 0.3 MG tablet  Commonly known as: PREMARIN     latanoprost 0.005 % ophthalmic solution  Commonly known as: XALATAN     oxybutynin 5 MG extended release tablet  Commonly known as: Ditropan XL  Take 1 tablet by mouth daily     timolol 0.5 % ophthalmic solution  Commonly known as: TIMOPTIC        STOP taking these medications    pravastatin 20 MG tablet  Commonly known as: Pravachol           Where to Get Your Medications      These medications were sent to [address] [place] [place], [address]    Phone: [phone]   · amLODIPine 5 MG tablet  · aspirin 81 MG chewable tablet  · atorvastatin 10 MG tablet  · clopidogrel 75 MG tablet  · meclizine 25 MG tablet     You can get these medications from any pharmacy    Bring a paper prescription for each of these medications  · diazePAM 2 MG tablet       Activity: activity as tolerated  Diet: regular diet    Pt has been advised to: Follow-up with Sony Mehta DO in 1 week.   Follow-up with consultants as recommended by them    Note that over 30 minutes was spent in preparing discharge papers, discussing discharge with patient, medication review, etc.    Signed:  Earl Garcia MD  9/13/2021  12:44 PM

## 2021-09-17 LAB
SARS-COV-2: NOT DETECTED
SOURCE: NORMAL

## 2021-09-23 NOTE — PROGRESS NOTES
Physician Progress Note      PATIENT:               Cipriano Peterson  CSN #:                  [id number]  :                       1929  ADMIT DATE:       2021 4:59 PM  [address] [place] DATE:        2021 5:50 PM  RESPONDING  PROVIDER #:        RADHA Sauceda MD          QUERY TEXT:    Patient admitted with dizziness and falls. Neurology documents Probable acute   posterior circulation stroke secondary to acute L VA thrombus, possibly L   cerebellar. NSGY does not feel her L frontotemporal arachonoid cyst is causing   any of her issues. If possible, please document in the progress notes and   discharge summary if Acute CVA was: The medical record reflects the following:  Risk Factors: dizziness, falls  Clinical Indicators: 21 Neurology notes \"Probable acute posterior   circulation stroke secondary to acute L VA thrombus: possibly L cerebellar in   light of vertigo symptoms and previous cerebellar strokes seen on CTAs. Need   repeat imaging to eval for stroke evolution, but cannot have MRI. Her exam is   nonfocal but her vertigo persists. Medical management at this time is   recommended with antiplatelet and statin--possible anticoag in the future   pending reassessment and functional status of pt. Her dizziness may persist   and increase her risk for falls; NSGY does not feel her L   Treatment: CT Head, Stroke education, Stroke clinic follow up, Valium    Thank you,  Brian Campa RN, BSN, CCDS, Clinical Documentation Improvement  [phone]  Options provided:  -- Acute CVA confirmed after study  -- Acute CVA ruled out after study, dizziness due to other specified, please   specify. -- Other - I will add my own diagnosis  -- Disagree - Not applicable / Not valid  -- Disagree - Clinically unable to determine / Unknown  -- Refer to Clinical Documentation Reviewer    PROVIDER RESPONSE TEXT:    Acute CVA confirmed after study.     Query created by: Alyssa Powers on 2021 5:53 AM      Electronically signed by: RADHA GUILLERMO MD 9/23/2021 6:05 PM

## 2021-09-28 ENCOUNTER — TELEPHONE (OUTPATIENT)
Dept: NEUROLOGY | Age: 86
End: 2021-09-28

## 2021-09-28 NOTE — TELEPHONE ENCOUNTER
MA left message for patient's son to call back regarding his mom's appt on 10/1/21.  Appt needs moved and is offering appt today at 1 pm.  Electronically signed by Luis F Archer MA on 9/28/21 at 10:01 AM EDT

## 2021-11-02 ENCOUNTER — PROCEDURE VISIT (OUTPATIENT)
Dept: AUDIOLOGY | Age: 86
End: 2021-11-02
Payer: MEDICARE

## 2021-11-02 ENCOUNTER — OFFICE VISIT (OUTPATIENT)
Dept: ENT CLINIC | Age: 86
End: 2021-11-02
Payer: MEDICARE

## 2021-11-02 VITALS
BODY MASS INDEX: 24.98 KG/M2 | HEIGHT: 63 IN | WEIGHT: 141 LBS | DIASTOLIC BLOOD PRESSURE: 75 MMHG | HEART RATE: 111 BPM | SYSTOLIC BLOOD PRESSURE: 148 MMHG

## 2021-11-02 DIAGNOSIS — H81.313 VERTIGO, AURAL, BILATERAL: ICD-10-CM

## 2021-11-02 DIAGNOSIS — H61.21 IMPACTED CERUMEN OF RIGHT EAR: ICD-10-CM

## 2021-11-02 DIAGNOSIS — H90.3 SENSORINEURAL HEARING LOSS (SNHL) OF BOTH EARS: ICD-10-CM

## 2021-11-02 DIAGNOSIS — H90.3 SENSORINEURAL HEARING LOSS, BILATERAL: ICD-10-CM

## 2021-11-02 DIAGNOSIS — R42 DISEQUILIBRIUM: Primary | ICD-10-CM

## 2021-11-02 PROCEDURE — 69210 REMOVE IMPACTED EAR WAX UNI: CPT | Performed by: NURSE PRACTITIONER

## 2021-11-02 PROCEDURE — 99213 OFFICE O/P EST LOW 20 MIN: CPT | Performed by: NURSE PRACTITIONER

## 2021-11-02 PROCEDURE — 92567 TYMPANOMETRY: CPT | Performed by: AUDIOLOGIST

## 2021-11-02 PROCEDURE — 92557 COMPREHENSIVE HEARING TEST: CPT | Performed by: AUDIOLOGIST

## 2021-11-02 RX ORDER — METHYLPREDNISOLONE 4 MG/1
4 TABLET ORAL SEE ADMIN INSTRUCTIONS
Qty: 1 KIT | Refills: 0 | Status: SHIPPED | OUTPATIENT
Start: 2021-11-02 | End: 2021-11-08

## 2021-11-02 NOTE — PROGRESS NOTES
[facility]  Dr. Hua Barahona. YAJAIRA Kahtleen Ms.Ed. New Consult       Patient Name:  Mary Weems  :  1929     CHIEF C/O:    Chief Complaint   Patient presents with    Ear Problem     both ears left more than right        HISTORY OBTAINED FROM:  patient, family member - son    HISTORY OF PRESENT ILLNESS:       Johnny Hanson is a 80y.o. year old female, here today for cerumen impactions and vertigo symptoms. Symptoms for 1-2 months  Was seen in ER for vertigo, admitted for possible CVA  Vertigo symptoms have stayed the same  Son states symptoms began after after starting Timolol drops  Stops drops with improvement of symptoms after  States a sensation of disequilibrium vs. Room spinning vetigo  Rapid head movements  Aggravate symptoms  Denies nausea with symptoms  Does have noticed hearing loss  Denies ringing in the ears. No hearing changes prior to episodes  No current pain in ears  Had some cerumen removed at PCP office 1 day ago.           Past Medical History:   Diagnosis Date    Age-related osteoporosis without current pathological fracture     Carpal tunnel syndrome     Gastro-esophageal reflux disease without esophagitis     Hyperlipidemia      Past Surgical History:   Procedure Laterality Date    TOTAL HIP ARTHROPLASTY         Current Outpatient Medications:     aspirin 81 MG chewable tablet, Take 1 tablet by mouth daily, Disp: 30 tablet, Rfl: 3    amLODIPine (NORVASC) 5 MG tablet, Take 1 tablet by mouth daily, Disp: 30 tablet, Rfl: 3    clopidogrel (PLAVIX) 75 MG tablet, Take 1 tablet by mouth daily, Disp: 30 tablet, Rfl: 3    oxybutynin (DITROPAN XL) 5 MG extended release tablet, Take 1 tablet by mouth daily, Disp: 30 tablet, Rfl: 1    estrogens, conjugated, (PREMARIN) 0.3 MG tablet, Take 0.3 mg by mouth daily, Disp: , Rfl:     atorvastatin (LIPITOR) 10 MG tablet, Take 1 tablet by mouth nightly (Patient not taking: Reported on 2021), Disp: 30 tablet, Rfl: 3    timolol (TIMOPTIC) 0.5 % ophthalmic solution, Apply 1 drop to eye daily (Patient not taking: Reported on 2021), Disp: , Rfl:     latanoprost (XALATAN) 0.005 % ophthalmic solution, Apply 1 drop to eye nightly  (Patient not taking: Reported on 2021), Disp: , Rfl:   Celecoxib, Pregabalin, and Risedronate sodium  Social History     Tobacco Use    Smoking status: Former Smoker     Quit date:      Years since quittin.8    Smokeless tobacco: Never Used   Vaping Use    Vaping Use: Never used   Substance Use Topics    Alcohol use: Never    Drug use: Never     History reviewed. No pertinent family history. Review of Systems   Constitutional: Negative. Negative for activity change and appetite change. HENT: Positive for hearing loss. Negative for ear discharge, ear pain and tinnitus. Eyes: Negative. Respiratory: Negative. Negative for shortness of breath and stridor. Cardiovascular: Negative. Negative for chest pain and palpitations. Endocrine: Negative. Skin: Negative. Neurological: Positive for dizziness. Hematological: Negative. Psychiatric/Behavioral: Negative. BP (!) 148/75 (Site: Left Upper Arm, Position: Sitting, Cuff Size: Medium Adult)   Pulse 111   Ht 5' 3\" (1.6 m)   Wt 141 lb (64 kg)   BMI 24.98 kg/m²   Physical Exam  Constitutional:       Appearance: Normal appearance. HENT:      Head: Normocephalic. Right Ear: Tympanic membrane, ear canal and external ear normal. There is impacted cerumen. Left Ear: Tympanic membrane and external ear normal.      Ears:        Comments: Right TM normal after cerumen removed. Nose: Nose normal. No rhinorrhea. Right Turbinates: Not pale. Left Turbinates: Not pale. Mouth/Throat:      Lips: Pink. Mouth: Mucous membranes are moist.   Eyes:      Conjunctiva/sclera: Conjunctivae normal.      Pupils: Pupils are equal, round, and reactive to light.    Cardiovascular:      Rate and Rhythm: Normal rate and regular rhythm. Pulses: Normal pulses. Pulmonary:      Effort: Pulmonary effort is normal. No respiratory distress. Breath sounds: No stridor. Musculoskeletal:         General: Normal range of motion. Cervical back: Normal range of motion. No rigidity. No muscular tenderness. Skin:     General: Skin is warm and dry. Neurological:      General: No focal deficit present. Mental Status: She is alert and oriented to person, place, and time. Psychiatric:         Mood and Affect: Mood normal.         Behavior: Behavior normal.         Thought Content: Thought content normal.         Judgment: Judgment normal.           Audiogram and tympanogram reviewed with patient. Audiogram reveals 40 dB hearing loss in the right ear with 40% discrimination at 80 dB, 35 dB of hearing loss in the left ear with 60% discrimination at 75 dB. Audiogram is symmetrical. Tympanogram reveals type A curve in the right ear, with type A curve in the left ear. IMPRESSION/PLAN:  Savi cutler:  LEFT: (negative)   RIGHT: (negative)    Epley was not performed on the bilaterally x none     Recheck was not done     Cerumen removal     Auditory canal(s) right ear completely obstructed with cerumen. Cerumen was gently removed using soft plastic curette, gentle suction. Tympanic membranes are intact following the procedure. Auditory canals appear normal.      Bill Barnes was seen today for ear problem. Diagnoses and all orders for this visit:    [facility], [address] [place] [address] [facility] Cummings, Audiology, [place]    Sensorineural hearing loss (SNHL) of both ears    Impacted cerumen of right ear  -     IN REMOVAL IMPACTED CERUMEN INSTRUMENTATION UNILAT    Other orders  -     methylPREDNISolone (MEDROL DOSEPACK) 4 MG tablet; Take 1 tablet by mouth See Admin Instructions for 6 days Take by mouth.       Right cerumen impaction was removed without difficulty revealing a normal TM on the right. There is a hematoma in the left ear canal from previous cerumen removal at PCPs office. Savi-Hallpike maneuver was performed on the patient and found to be negative bilaterally. Patient is given options of a VNG or attempting vestibular rehab for her dizziness symptoms. She elects to try physical therapy first with a VNG in the future with no improvement. She will be placed on a Medrol dose pack, to be taken as directed. She will follow up in 6 weeks after completion of vestibular rehab. She is instructed to call with any new or worsening symptoms prior to her next appointment.         Airam Weston, ALEXIS, FNP-C  [facility], Nose and Throat    The information contained in this note has been dictated using drug and medical speech recognition software and may contain errors

## 2021-11-02 NOTE — PROGRESS NOTES
This patient was referred for audiometric/tympanometric testing by YUSRA Steven due to vertigo and a bilateral decrease in hearing sensitivity. Patient has been experiencing increased difficulty understanding conversation, especially in noisy situations. Audiometry revealed a mild-to-severe sensorineural hearing loss, through the frequency range, bilaterally. Reliability was good. Speech reception thresholds were in good agreement with the pure tone averages, bilaterally. Speech discrimination scores were 40%, right ear and 60%, left ear at 75-80dBHL. Tympanometry revealed normal middle ear peak pressure and compliance, bilaterally. Ipsilateral acoustic reflexes were absent, right ear and present, left ear at 1000Hz. The results were reviewed with the patient. The benefits and limitations of amplification were discussed with the patient and their family. It is recommended that the patient be fit with binaural hearing aids. The patient's insurance benefits will be reviewed for hearing aid coverage and information will be sent to the patient.       Tonya Lamas CCC/ROCKY  Audiologist  [id number]  NPI#:  [id number]

## 2021-11-10 ASSESSMENT — ENCOUNTER SYMPTOMS
RESPIRATORY NEGATIVE: 1
EYES NEGATIVE: 1
SHORTNESS OF BREATH: 0
STRIDOR: 0

## 2021-11-12 RX ORDER — CLOPIDOGREL BISULFATE 75 MG/1
75 TABLET ORAL DAILY
Qty: 30 TABLET | Refills: 2 | Status: SHIPPED
Start: 2021-11-12 | End: 2021-12-13 | Stop reason: SDUPTHER

## 2022-01-11 ENCOUNTER — TELEPHONE (OUTPATIENT)
Dept: ENT CLINIC | Age: 87
End: 2022-01-11

## 2022-01-11 NOTE — TELEPHONE ENCOUNTER
Pt called high upset stating \"she never signed up for any more therapy and has no knowledge of why she received a letter? \"  I advised pt she made a follow-up appt at her last visit, she stated no she did not. I notified pt, that's okay if she no longer needs to be seen or therapy for vertigo, were here if she needs us. She said okay and hung up the phone.

## 2025-01-28 ENCOUNTER — APPOINTMENT (OUTPATIENT)
Dept: GENERAL RADIOLOGY | Age: 89
DRG: 193 | End: 2025-01-28
Payer: MEDICARE

## 2025-01-28 ENCOUNTER — HOSPITAL ENCOUNTER (INPATIENT)
Age: 89
LOS: 7 days | Discharge: HOME | DRG: 193 | End: 2025-02-04
Attending: EMERGENCY MEDICINE | Admitting: INTERNAL MEDICINE
Payer: MEDICARE

## 2025-01-28 DIAGNOSIS — J18.9 PNEUMONIA DUE TO INFECTIOUS ORGANISM, UNSPECIFIED LATERALITY, UNSPECIFIED PART OF LUNG: ICD-10-CM

## 2025-01-28 DIAGNOSIS — J96.01 ACUTE RESPIRATORY FAILURE WITH HYPOXIA: Primary | ICD-10-CM

## 2025-01-28 LAB
ANION GAP SERPL CALCULATED.3IONS-SCNC: 10 MMOL/L (ref 7–16)
B PARAP IS1001 DNA NPH QL NAA+NON-PROBE: NOT DETECTED
B PERT DNA SPEC QL NAA+PROBE: NOT DETECTED
BASOPHILS # BLD: 0.01 K/UL (ref 0–0.2)
BASOPHILS NFR BLD: 0 % (ref 0–2)
BNP SERPL-MCNC: 1349 PG/ML (ref 0–450)
BUN SERPL-MCNC: 16 MG/DL (ref 6–23)
C PNEUM DNA NPH QL NAA+NON-PROBE: NOT DETECTED
CALCIUM SERPL-MCNC: 8.7 MG/DL (ref 8.6–10.2)
CHLORIDE SERPL-SCNC: 101 MMOL/L (ref 98–107)
CO2 SERPL-SCNC: 27 MMOL/L (ref 22–29)
CREAT SERPL-MCNC: 0.9 MG/DL (ref 0.5–1)
EOSINOPHIL # BLD: 0.02 K/UL (ref 0.05–0.5)
EOSINOPHILS RELATIVE PERCENT: 0 % (ref 0–6)
ERYTHROCYTE [DISTWIDTH] IN BLOOD BY AUTOMATED COUNT: 12.4 % (ref 11.5–15)
FLUAV RNA NPH QL NAA+NON-PROBE: NOT DETECTED
FLUBV RNA NPH QL NAA+NON-PROBE: NOT DETECTED
GFR, ESTIMATED: 59 ML/MIN/1.73M2
GLUCOSE SERPL-MCNC: 123 MG/DL (ref 74–99)
HADV DNA NPH QL NAA+NON-PROBE: NOT DETECTED
HCOV 229E RNA NPH QL NAA+NON-PROBE: NOT DETECTED
HCOV HKU1 RNA NPH QL NAA+NON-PROBE: NOT DETECTED
HCOV NL63 RNA NPH QL NAA+NON-PROBE: NOT DETECTED
HCOV OC43 RNA NPH QL NAA+NON-PROBE: NOT DETECTED
HCT VFR BLD AUTO: 40.1 % (ref 34–48)
HGB BLD-MCNC: 13.4 G/DL (ref 11.5–15.5)
HMPV RNA NPH QL NAA+NON-PROBE: NOT DETECTED
HPIV1 RNA NPH QL NAA+NON-PROBE: NOT DETECTED
HPIV2 RNA NPH QL NAA+NON-PROBE: NOT DETECTED
HPIV3 RNA NPH QL NAA+NON-PROBE: NOT DETECTED
HPIV4 RNA NPH QL NAA+NON-PROBE: NOT DETECTED
IMM GRANULOCYTES # BLD AUTO: 0.06 K/UL (ref 0–0.58)
IMM GRANULOCYTES NFR BLD: 1 % (ref 0–5)
LACTATE BLDV-SCNC: 1.6 MMOL/L (ref 0.5–2.2)
LYMPHOCYTES NFR BLD: 1.54 K/UL (ref 1.5–4)
LYMPHOCYTES RELATIVE PERCENT: 14 % (ref 20–42)
M PNEUMO DNA NPH QL NAA+NON-PROBE: NOT DETECTED
MCH RBC QN AUTO: 31.3 PG (ref 26–35)
MCHC RBC AUTO-ENTMCNC: 33.4 G/DL (ref 32–34.5)
MCV RBC AUTO: 93.7 FL (ref 80–99.9)
MONOCYTES NFR BLD: 1.28 K/UL (ref 0.1–0.95)
MONOCYTES NFR BLD: 11 % (ref 2–12)
NEUTROPHILS NFR BLD: 74 % (ref 43–80)
NEUTS SEG NFR BLD: 8.42 K/UL (ref 1.8–7.3)
PLATELET # BLD AUTO: 200 K/UL (ref 130–450)
PMV BLD AUTO: 9.5 FL (ref 7–12)
POTASSIUM SERPL-SCNC: 3.8 MMOL/L (ref 3.5–5)
RBC # BLD AUTO: 4.28 M/UL (ref 3.5–5.5)
RSV RNA NPH QL NAA+NON-PROBE: NOT DETECTED
RV+EV RNA NPH QL NAA+NON-PROBE: NOT DETECTED
SARS-COV-2 RNA NPH QL NAA+NON-PROBE: NOT DETECTED
SODIUM SERPL-SCNC: 138 MMOL/L (ref 132–146)
SPECIMEN DESCRIPTION: NORMAL
TROPONIN I SERPL HS-MCNC: 18 NG/L (ref 0–9)
TROPONIN I SERPL HS-MCNC: 19 NG/L (ref 0–9)
WBC OTHER # BLD: 11.3 K/UL (ref 4.5–11.5)

## 2025-01-28 PROCEDURE — 6360000002 HC RX W HCPCS

## 2025-01-28 PROCEDURE — 6370000000 HC RX 637 (ALT 250 FOR IP)

## 2025-01-28 PROCEDURE — 2140000000 HC CCU INTERMEDIATE R&B

## 2025-01-28 PROCEDURE — 93005 ELECTROCARDIOGRAM TRACING: CPT | Performed by: EMERGENCY MEDICINE

## 2025-01-28 PROCEDURE — 2580000003 HC RX 258

## 2025-01-28 PROCEDURE — 0202U NFCT DS 22 TRGT SARS-COV-2: CPT

## 2025-01-28 PROCEDURE — 83880 ASSAY OF NATRIURETIC PEPTIDE: CPT

## 2025-01-28 PROCEDURE — 71045 X-RAY EXAM CHEST 1 VIEW: CPT

## 2025-01-28 PROCEDURE — 2500000003 HC RX 250 WO HCPCS

## 2025-01-28 PROCEDURE — 83605 ASSAY OF LACTIC ACID: CPT

## 2025-01-28 PROCEDURE — 87040 BLOOD CULTURE FOR BACTERIA: CPT

## 2025-01-28 PROCEDURE — 80048 BASIC METABOLIC PNL TOTAL CA: CPT

## 2025-01-28 PROCEDURE — 99285 EMERGENCY DEPT VISIT HI MDM: CPT

## 2025-01-28 PROCEDURE — 85025 COMPLETE CBC W/AUTO DIFF WBC: CPT

## 2025-01-28 PROCEDURE — 84484 ASSAY OF TROPONIN QUANT: CPT

## 2025-01-28 RX ORDER — IPRATROPIUM BROMIDE AND ALBUTEROL SULFATE 2.5; .5 MG/3ML; MG/3ML
1 SOLUTION RESPIRATORY (INHALATION) ONCE
Status: COMPLETED | OUTPATIENT
Start: 2025-01-28 | End: 2025-01-28

## 2025-01-28 RX ADMIN — WATER 1000 MG: 1 INJECTION INTRAMUSCULAR; INTRAVENOUS; SUBCUTANEOUS at 20:16

## 2025-01-28 RX ADMIN — DOXYCYCLINE 100 MG: 100 INJECTION, POWDER, LYOPHILIZED, FOR SOLUTION INTRAVENOUS at 20:22

## 2025-01-28 RX ADMIN — IPRATROPIUM BROMIDE AND ALBUTEROL SULFATE 1 DOSE: 2.5; .5 SOLUTION RESPIRATORY (INHALATION) at 14:45

## 2025-01-28 ASSESSMENT — LIFESTYLE VARIABLES: HOW OFTEN DO YOU HAVE A DRINK CONTAINING ALCOHOL: NEVER

## 2025-01-28 NOTE — ED PROVIDER NOTES
89-year-old female with history of GERD, A28 osteoporosis, hyperlipidemia, arthritis presents to the emergency department for flulike symptoms.  The patient states has been having cough nasal congestions generalized weakness body aches and body chills for the past 2 weeks.  She stated that her son had similar symptoms.  There is report that she required oxygen at home which brought her to the emergency department.  Said her cough has not been productive.  She reports generalized body wide pain no chest pressure no pleuritic chest pain no hemoptysis no leg swelling no orthopnea unsure whether she is having fever.  No change in her urinary habits no GI bleed.      Chief Complaint   Patient presents with    Cough    Nasal Congestion     Pt has had flu like symptoms. Now needing oxygen.        Review of Systems   Pert stated in the hpi above   Physical Exam  Vitals reviewed.   Constitutional:       General: She is not in acute distress.     Appearance: She is not ill-appearing.   HENT:      Head: Normocephalic.      Right Ear: External ear normal.      Left Ear: External ear normal.      Nose: Nose normal.      Mouth/Throat:      Mouth: Mucous membranes are moist.   Eyes:      General:         Right eye: No discharge.         Left eye: No discharge.      Conjunctiva/sclera: Conjunctivae normal.   Cardiovascular:      Rate and Rhythm: Normal rate and regular rhythm.      Heart sounds:      No friction rub. No gallop.   Pulmonary:      Effort: No respiratory distress.      Breath sounds: No stridor. Rhonchi present.   Abdominal:      General: There is no distension.      Tenderness: There is no abdominal tenderness. There is no guarding or rebound.   Musculoskeletal:         General: No deformity or signs of injury.      Cervical back: Normal range of motion and neck supple. No rigidity or tenderness.   Skin:     Coloration: Skin is not jaundiced.   Neurological:      Mental Status: She is alert.      Sensory: No sensory

## 2025-01-29 LAB
ANION GAP SERPL CALCULATED.3IONS-SCNC: 12 MMOL/L (ref 7–16)
BASOPHILS # BLD: 0.02 K/UL (ref 0–0.2)
BASOPHILS NFR BLD: 0 % (ref 0–2)
BUN SERPL-MCNC: 15 MG/DL (ref 6–23)
CALCIUM SERPL-MCNC: 8.9 MG/DL (ref 8.6–10.2)
CHLORIDE SERPL-SCNC: 102 MMOL/L (ref 98–107)
CO2 SERPL-SCNC: 25 MMOL/L (ref 22–29)
CREAT SERPL-MCNC: 0.9 MG/DL (ref 0.5–1)
EOSINOPHIL # BLD: 0.02 K/UL (ref 0.05–0.5)
EOSINOPHILS RELATIVE PERCENT: 0 % (ref 0–6)
ERYTHROCYTE [DISTWIDTH] IN BLOOD BY AUTOMATED COUNT: 12.3 % (ref 11.5–15)
GFR, ESTIMATED: 59 ML/MIN/1.73M2
GLUCOSE SERPL-MCNC: 108 MG/DL (ref 74–99)
HCT VFR BLD AUTO: 39.9 % (ref 34–48)
HGB BLD-MCNC: 13.2 G/DL (ref 11.5–15.5)
IMM GRANULOCYTES # BLD AUTO: 0.04 K/UL (ref 0–0.58)
IMM GRANULOCYTES NFR BLD: 0 % (ref 0–5)
LYMPHOCYTES NFR BLD: 1.2 K/UL (ref 1.5–4)
LYMPHOCYTES RELATIVE PERCENT: 12 % (ref 20–42)
MCH RBC QN AUTO: 31.4 PG (ref 26–35)
MCHC RBC AUTO-ENTMCNC: 33.1 G/DL (ref 32–34.5)
MCV RBC AUTO: 94.8 FL (ref 80–99.9)
MONOCYTES NFR BLD: 1.26 K/UL (ref 0.1–0.95)
MONOCYTES NFR BLD: 12 % (ref 2–12)
NEUTROPHILS NFR BLD: 75 % (ref 43–80)
NEUTS SEG NFR BLD: 7.79 K/UL (ref 1.8–7.3)
PLATELET # BLD AUTO: 232 K/UL (ref 130–450)
PMV BLD AUTO: 9.7 FL (ref 7–12)
POTASSIUM SERPL-SCNC: 3.8 MMOL/L (ref 3.5–5)
PROCALCITONIN SERPL-MCNC: 0.07 NG/ML (ref 0–0.08)
RBC # BLD AUTO: 4.21 M/UL (ref 3.5–5.5)
SODIUM SERPL-SCNC: 139 MMOL/L (ref 132–146)
WBC OTHER # BLD: 10.3 K/UL (ref 4.5–11.5)

## 2025-01-29 PROCEDURE — 2140000000 HC CCU INTERMEDIATE R&B

## 2025-01-29 PROCEDURE — 2500000003 HC RX 250 WO HCPCS: Performed by: NURSE PRACTITIONER

## 2025-01-29 PROCEDURE — 97165 OT EVAL LOW COMPLEX 30 MIN: CPT

## 2025-01-29 PROCEDURE — 97535 SELF CARE MNGMENT TRAINING: CPT

## 2025-01-29 PROCEDURE — 2700000000 HC OXYGEN THERAPY PER DAY

## 2025-01-29 PROCEDURE — 80048 BASIC METABOLIC PNL TOTAL CA: CPT

## 2025-01-29 PROCEDURE — 6370000000 HC RX 637 (ALT 250 FOR IP): Performed by: NURSE PRACTITIONER

## 2025-01-29 PROCEDURE — 36415 COLL VENOUS BLD VENIPUNCTURE: CPT

## 2025-01-29 PROCEDURE — 6360000002 HC RX W HCPCS: Performed by: NURSE PRACTITIONER

## 2025-01-29 PROCEDURE — 85025 COMPLETE CBC W/AUTO DIFF WBC: CPT

## 2025-01-29 PROCEDURE — 94640 AIRWAY INHALATION TREATMENT: CPT

## 2025-01-29 PROCEDURE — 84145 PROCALCITONIN (PCT): CPT

## 2025-01-29 RX ORDER — DOXYCYCLINE 100 MG/1
100 CAPSULE ORAL EVERY 12 HOURS SCHEDULED
Status: COMPLETED | OUTPATIENT
Start: 2025-01-29 | End: 2025-02-02

## 2025-01-29 RX ORDER — LEVOTHYROXINE SODIUM 50 UG/1
25 TABLET ORAL DAILY
Status: DISCONTINUED | OUTPATIENT
Start: 2025-01-29 | End: 2025-02-04 | Stop reason: HOSPADM

## 2025-01-29 RX ORDER — ACETAMINOPHEN 650 MG/1
650 SUPPOSITORY RECTAL EVERY 6 HOURS PRN
Status: DISCONTINUED | OUTPATIENT
Start: 2025-01-29 | End: 2025-02-04 | Stop reason: HOSPADM

## 2025-01-29 RX ORDER — ONDANSETRON 4 MG/1
4 TABLET, ORALLY DISINTEGRATING ORAL EVERY 8 HOURS PRN
Status: DISCONTINUED | OUTPATIENT
Start: 2025-01-29 | End: 2025-02-04 | Stop reason: HOSPADM

## 2025-01-29 RX ORDER — CLOPIDOGREL BISULFATE 75 MG/1
75 TABLET ORAL DAILY
Status: DISCONTINUED | OUTPATIENT
Start: 2025-01-29 | End: 2025-02-04 | Stop reason: HOSPADM

## 2025-01-29 RX ORDER — SODIUM CHLORIDE 0.9 % (FLUSH) 0.9 %
10 SYRINGE (ML) INJECTION PRN
Status: DISCONTINUED | OUTPATIENT
Start: 2025-01-29 | End: 2025-02-04 | Stop reason: HOSPADM

## 2025-01-29 RX ORDER — IPRATROPIUM BROMIDE AND ALBUTEROL SULFATE 2.5; .5 MG/3ML; MG/3ML
1 SOLUTION RESPIRATORY (INHALATION)
Status: DISCONTINUED | OUTPATIENT
Start: 2025-01-29 | End: 2025-02-04 | Stop reason: HOSPADM

## 2025-01-29 RX ORDER — SODIUM CHLORIDE 0.9 % (FLUSH) 0.9 %
5-40 SYRINGE (ML) INJECTION EVERY 12 HOURS SCHEDULED
Status: DISCONTINUED | OUTPATIENT
Start: 2025-01-29 | End: 2025-02-04 | Stop reason: HOSPADM

## 2025-01-29 RX ORDER — ENOXAPARIN SODIUM 100 MG/ML
40 INJECTION SUBCUTANEOUS DAILY
Status: DISCONTINUED | OUTPATIENT
Start: 2025-01-29 | End: 2025-02-01 | Stop reason: DRUGHIGH

## 2025-01-29 RX ORDER — ONDANSETRON 2 MG/ML
4 INJECTION INTRAMUSCULAR; INTRAVENOUS EVERY 6 HOURS PRN
Status: DISCONTINUED | OUTPATIENT
Start: 2025-01-29 | End: 2025-02-04 | Stop reason: HOSPADM

## 2025-01-29 RX ORDER — EZETIMIBE 10 MG/1
10 TABLET ORAL DAILY
Status: DISCONTINUED | OUTPATIENT
Start: 2025-01-29 | End: 2025-02-04 | Stop reason: HOSPADM

## 2025-01-29 RX ORDER — ACETAMINOPHEN 325 MG/1
650 TABLET ORAL EVERY 6 HOURS PRN
Status: DISCONTINUED | OUTPATIENT
Start: 2025-01-29 | End: 2025-02-04 | Stop reason: HOSPADM

## 2025-01-29 RX ORDER — SODIUM CHLORIDE 9 MG/ML
INJECTION, SOLUTION INTRAVENOUS PRN
Status: DISCONTINUED | OUTPATIENT
Start: 2025-01-29 | End: 2025-02-04 | Stop reason: HOSPADM

## 2025-01-29 RX ADMIN — DOXYCYCLINE HYCLATE 100 MG: 100 CAPSULE ORAL at 08:48

## 2025-01-29 RX ADMIN — IPRATROPIUM BROMIDE AND ALBUTEROL SULFATE 1 DOSE: 2.5; .5 SOLUTION RESPIRATORY (INHALATION) at 06:04

## 2025-01-29 RX ADMIN — IPRATROPIUM BROMIDE AND ALBUTEROL SULFATE 1 DOSE: 2.5; .5 SOLUTION RESPIRATORY (INHALATION) at 12:21

## 2025-01-29 RX ADMIN — IPRATROPIUM BROMIDE AND ALBUTEROL SULFATE 1 DOSE: 2.5; .5 SOLUTION RESPIRATORY (INHALATION) at 16:04

## 2025-01-29 RX ADMIN — SODIUM CHLORIDE, PRESERVATIVE FREE 10 ML: 5 INJECTION INTRAVENOUS at 08:52

## 2025-01-29 RX ADMIN — ENOXAPARIN SODIUM 40 MG: 100 INJECTION SUBCUTANEOUS at 08:48

## 2025-01-29 RX ADMIN — WATER 1000 MG: 1 INJECTION INTRAMUSCULAR; INTRAVENOUS; SUBCUTANEOUS at 20:22

## 2025-01-29 RX ADMIN — SODIUM CHLORIDE, PRESERVATIVE FREE 10 ML: 5 INJECTION INTRAVENOUS at 20:24

## 2025-01-29 RX ADMIN — CLOPIDOGREL BISULFATE 75 MG: 75 TABLET ORAL at 08:48

## 2025-01-29 RX ADMIN — EZETIMIBE 10 MG: 10 TABLET ORAL at 08:48

## 2025-01-29 RX ADMIN — LEVOTHYROXINE SODIUM 25 MCG: 0.05 TABLET ORAL at 08:48

## 2025-01-29 RX ADMIN — ACETAMINOPHEN 650 MG: 325 TABLET ORAL at 08:47

## 2025-01-29 RX ADMIN — IPRATROPIUM BROMIDE AND ALBUTEROL SULFATE 1 DOSE: 2.5; .5 SOLUTION RESPIRATORY (INHALATION) at 19:49

## 2025-01-29 RX ADMIN — DOXYCYCLINE HYCLATE 100 MG: 100 CAPSULE ORAL at 20:22

## 2025-01-29 NOTE — H&P
[facility]  History and Physical      CHIEF COMPLAINT:    Chief Complaint   Patient presents with    Cough    Nasal Congestion     Pt has had flu like symptoms. Now needing oxygen.         Patient of Taye Jay DO presents with:  Acute respiratory failure with hypoxia    History of Present Illness:   Patient is a 89-year-old female with a past medical history of HLD, hypothyroidism who presents emergency room for nasal congestion and cough.  Patient has been experiencing flulike symptoms and became hypoxic prompting her to come to the emergency room for further evaluation.  On arrival patient is chest x-ray which revealed no acute findings.  Labs on arrival revealed mildly elevated proBNP of 1, 349, troponin 18-19.  Patient is currently utilizing 2 to 3 L nasal cannula to maintain adequate oxygenation.  Patient is admitted to intermediate telemetry for further workup and treatment.  She is comfortable on evaluation in no acute distress.    REVIEW OF SYSTEMS:  Pertinent negatives are above in HPI.  10 point ROS otherwise negative.      Past Medical History:   Diagnosis Date    Age-related osteoporosis without current pathological fracture     Carpal tunnel syndrome     Gastro-esophageal reflux disease without esophagitis     Hyperlipidemia          Past Surgical History:   Procedure Laterality Date    TOTAL HIP ARTHROPLASTY  2020       Medications Prior to Admission:    Not in a hospital admission.    Note that the patient's home medications were reviewed and the above list is accurate to the best of my knowledge at the time of the exam.    Allergies:    Celecoxib, Pregabalin, and Risedronate sodium    Social History:    reports that she quit smoking about 56 years ago. Her smoking use included cigarettes. She has never used smokeless tobacco. She reports that she does not drink alcohol and does not use drugs.    Family History:   family history is not on file.      PHYSICAL

## 2025-01-30 LAB
ANION GAP SERPL CALCULATED.3IONS-SCNC: 14 MMOL/L (ref 7–16)
BASOPHILS # BLD: 0.02 K/UL (ref 0–0.2)
BASOPHILS NFR BLD: 0 % (ref 0–2)
BUN SERPL-MCNC: 17 MG/DL (ref 6–23)
CALCIUM SERPL-MCNC: 8.7 MG/DL (ref 8.6–10.2)
CHLORIDE SERPL-SCNC: 104 MMOL/L (ref 98–107)
CO2 SERPL-SCNC: 24 MMOL/L (ref 22–29)
CREAT SERPL-MCNC: 0.9 MG/DL (ref 0.5–1)
EOSINOPHIL # BLD: 0.06 K/UL (ref 0.05–0.5)
EOSINOPHILS RELATIVE PERCENT: 1 % (ref 0–6)
ERYTHROCYTE [DISTWIDTH] IN BLOOD BY AUTOMATED COUNT: 12.3 % (ref 11.5–15)
GFR, ESTIMATED: 59 ML/MIN/1.73M2
GLUCOSE SERPL-MCNC: 94 MG/DL (ref 74–99)
HCT VFR BLD AUTO: 40.4 % (ref 34–48)
HGB BLD-MCNC: 13 G/DL (ref 11.5–15.5)
IMM GRANULOCYTES # BLD AUTO: 0.04 K/UL (ref 0–0.58)
IMM GRANULOCYTES NFR BLD: 1 % (ref 0–5)
LYMPHOCYTES NFR BLD: 1.37 K/UL (ref 1.5–4)
LYMPHOCYTES RELATIVE PERCENT: 16 % (ref 20–42)
MCH RBC QN AUTO: 30.7 PG (ref 26–35)
MCHC RBC AUTO-ENTMCNC: 32.2 G/DL (ref 32–34.5)
MCV RBC AUTO: 95.3 FL (ref 80–99.9)
MONOCYTES NFR BLD: 1.19 K/UL (ref 0.1–0.95)
MONOCYTES NFR BLD: 14 % (ref 2–12)
NEUTROPHILS NFR BLD: 70 % (ref 43–80)
NEUTS SEG NFR BLD: 6.13 K/UL (ref 1.8–7.3)
PLATELET # BLD AUTO: 247 K/UL (ref 130–450)
PMV BLD AUTO: 9.6 FL (ref 7–12)
POTASSIUM SERPL-SCNC: 4.2 MMOL/L (ref 3.5–5)
RBC # BLD AUTO: 4.24 M/UL (ref 3.5–5.5)
SODIUM SERPL-SCNC: 142 MMOL/L (ref 132–146)
WBC OTHER # BLD: 8.8 K/UL (ref 4.5–11.5)

## 2025-01-30 PROCEDURE — 2500000003 HC RX 250 WO HCPCS: Performed by: NURSE PRACTITIONER

## 2025-01-30 PROCEDURE — 94640 AIRWAY INHALATION TREATMENT: CPT

## 2025-01-30 PROCEDURE — 2700000000 HC OXYGEN THERAPY PER DAY

## 2025-01-30 PROCEDURE — 85025 COMPLETE CBC W/AUTO DIFF WBC: CPT

## 2025-01-30 PROCEDURE — 80048 BASIC METABOLIC PNL TOTAL CA: CPT

## 2025-01-30 PROCEDURE — 6360000002 HC RX W HCPCS: Performed by: NURSE PRACTITIONER

## 2025-01-30 PROCEDURE — 87899 AGENT NOS ASSAY W/OPTIC: CPT

## 2025-01-30 PROCEDURE — 6370000000 HC RX 637 (ALT 250 FOR IP): Performed by: NURSE PRACTITIONER

## 2025-01-30 PROCEDURE — 36415 COLL VENOUS BLD VENIPUNCTURE: CPT

## 2025-01-30 PROCEDURE — 2140000000 HC CCU INTERMEDIATE R&B

## 2025-01-30 PROCEDURE — 87449 NOS EACH ORGANISM AG IA: CPT

## 2025-01-30 PROCEDURE — 97530 THERAPEUTIC ACTIVITIES: CPT

## 2025-01-30 PROCEDURE — 97161 PT EVAL LOW COMPLEX 20 MIN: CPT

## 2025-01-30 RX ADMIN — EZETIMIBE 10 MG: 10 TABLET ORAL at 08:42

## 2025-01-30 RX ADMIN — SODIUM CHLORIDE, PRESERVATIVE FREE 10 ML: 5 INJECTION INTRAVENOUS at 20:29

## 2025-01-30 RX ADMIN — WATER 1000 MG: 1 INJECTION INTRAMUSCULAR; INTRAVENOUS; SUBCUTANEOUS at 20:28

## 2025-01-30 RX ADMIN — IPRATROPIUM BROMIDE AND ALBUTEROL SULFATE 1 DOSE: 2.5; .5 SOLUTION RESPIRATORY (INHALATION) at 15:14

## 2025-01-30 RX ADMIN — IPRATROPIUM BROMIDE AND ALBUTEROL SULFATE 1 DOSE: 2.5; .5 SOLUTION RESPIRATORY (INHALATION) at 10:54

## 2025-01-30 RX ADMIN — LEVOTHYROXINE SODIUM 25 MCG: 0.05 TABLET ORAL at 08:42

## 2025-01-30 RX ADMIN — IPRATROPIUM BROMIDE AND ALBUTEROL SULFATE 1 DOSE: 2.5; .5 SOLUTION RESPIRATORY (INHALATION) at 20:43

## 2025-01-30 RX ADMIN — CLOPIDOGREL BISULFATE 75 MG: 75 TABLET ORAL at 08:46

## 2025-01-30 RX ADMIN — DOXYCYCLINE HYCLATE 100 MG: 100 CAPSULE ORAL at 08:46

## 2025-01-30 RX ADMIN — DOXYCYCLINE HYCLATE 100 MG: 100 CAPSULE ORAL at 20:29

## 2025-01-30 RX ADMIN — ENOXAPARIN SODIUM 40 MG: 100 INJECTION SUBCUTANEOUS at 08:46

## 2025-01-30 RX ADMIN — WATER 40 MG: 1 INJECTION INTRAMUSCULAR; INTRAVENOUS; SUBCUTANEOUS at 22:49

## 2025-01-30 RX ADMIN — WATER 40 MG: 1 INJECTION INTRAMUSCULAR; INTRAVENOUS; SUBCUTANEOUS at 15:16

## 2025-01-30 RX ADMIN — SODIUM CHLORIDE, PRESERVATIVE FREE 10 ML: 5 INJECTION INTRAVENOUS at 22:49

## 2025-01-30 RX ADMIN — IPRATROPIUM BROMIDE AND ALBUTEROL SULFATE 1 DOSE: 2.5; .5 SOLUTION RESPIRATORY (INHALATION) at 07:53

## 2025-01-30 RX ADMIN — SODIUM CHLORIDE, PRESERVATIVE FREE 10 ML: 5 INJECTION INTRAVENOUS at 08:46

## 2025-01-30 NOTE — CARE COORDINATION
Social Work transition of Care Planning:   Patient admitted for acute respiratory failure with hypoxia. Patient and son live together in a one story home with 3 steps to enter. Patient uses a FWW to assist with ambulation, other DME owned shower chair, cane and bsc. Patient doesn't wear O2 at home, currently on 3 L O2, patient declined DME list no preference if O2 is needed. She has a hx with [facility]  [facility]ATH[facility].  Patient son assist with cooking and cleaning at home. Patient son Angelo will transport home.   Case Management Assessment  Initial Evaluation    Date/Time of Evaluation: 1/29/2025 7:16 PM  Assessment Completed by: ALEXANDER Bowens    If patient is discharged prior to next notation, then this note serves as note for discharge by case management.    Patient Name: Kelly Zabala                   YOB: 1929  Diagnosis: Acute respiratory failure with hypoxia [J96.01]  Pneumonia due to infectious organism, unspecified laterality, unspecified part of lung [J18.9]                   Date / Time: 1/28/2025  2:02 PM    Patient Admission Status: Inpatient   Readmission Risk (Low < 19, Mod (19-27), High > 27): Readmission Risk Score: 12.1    Current PCP: Taye Jay, DO  PCP verified by CM? Yes    Chart Reviewed: Yes      History Provided by: Patient  Patient Orientation: Alert and Oriented    Patient Cognition: Alert    Hospitalization in the last 30 days (Readmission):  No    If yes, Readmission Assessment in CM Navigator will be completed.    Advance Directives:      Code Status: Full Code   Patient's Primary Decision Maker is: Legal Next of Kin    Primary Decision Maker: Angelo Zabala - Child - [phone]    Discharge Planning:    Patient lives with: Children Type of Home: House  Primary Care Giver: Self  Patient Support Systems include: Children   Current Financial resources:    Current community resources:    Current services prior to admission: None            Current DME:

## 2025-01-30 NOTE — PLAN OF CARE
Problem: Chronic Conditions and Co-morbidities  Goal: Patient's chronic conditions and co-morbidity symptoms are monitored and maintained or improved  1/29/2025 2304 by Kavita Lyman RN  Outcome: Progressing  Flowsheets (Taken 1/29/2025 2022)  Care Plan - Patient's Chronic Conditions and Co-Morbidity Symptoms are Monitored and Maintained or Improved: Monitor and assess patient's chronic conditions and comorbid symptoms for stability, deterioration, or improvement  1/29/2025 1652 by Terri Healy RN  Outcome: Progressing     Problem: Discharge Planning  Goal: Discharge to home or other facility with appropriate resources  1/29/2025 2304 by Kavita Lyman RN  Outcome: Progressing  Flowsheets (Taken 1/29/2025 2022)  Discharge to home or other facility with appropriate resources: Identify barriers to discharge with patient and caregiver  1/29/2025 1652 by Terri Healy RN  Outcome: Progressing     Problem: Safety - Adult  Goal: Free from fall injury  1/29/2025 2304 by Kavita Lyman RN  Outcome: Progressing  1/29/2025 1652 by Terri Healy RN  Outcome: Progressing     Problem: ABCDS Injury Assessment  Goal: Absence of physical injury  1/29/2025 2304 by Kavita Lyman RN  Outcome: Progressing  1/29/2025 1652 by Terri Healy RN  Outcome: Progressing

## 2025-01-30 NOTE — ACP (ADVANCE CARE PLANNING)
Advance Care Planning   Healthcare Decision Maker:    Primary Decision Maker: Angelo Zabala - Child - [phone]

## 2025-01-31 ENCOUNTER — APPOINTMENT (OUTPATIENT)
Age: 89
DRG: 193 | End: 2025-01-31
Payer: MEDICARE

## 2025-01-31 ENCOUNTER — APPOINTMENT (OUTPATIENT)
Dept: GENERAL RADIOLOGY | Age: 89
DRG: 193 | End: 2025-01-31
Payer: MEDICARE

## 2025-01-31 LAB
ANION GAP SERPL CALCULATED.3IONS-SCNC: 15 MMOL/L (ref 7–16)
BASOPHILS # BLD: 0.01 K/UL (ref 0–0.2)
BASOPHILS NFR BLD: 0 % (ref 0–2)
BNP SERPL-MCNC: 1642 PG/ML (ref 0–450)
BUN SERPL-MCNC: 20 MG/DL (ref 6–23)
CALCIUM SERPL-MCNC: 9.3 MG/DL (ref 8.6–10.2)
CHLORIDE SERPL-SCNC: 101 MMOL/L (ref 98–107)
CO2 SERPL-SCNC: 22 MMOL/L (ref 22–29)
CREAT SERPL-MCNC: 0.8 MG/DL (ref 0.5–1)
ECHO AO ASC DIAM: 3 CM
ECHO AO ASCENDING AORTA INDEX: 1.78 CM/M2
ECHO AR MAX VEL PISA: 4.4 M/S
ECHO AV AREA PEAK VELOCITY: 1.7 CM2
ECHO AV AREA VTI: 1.8 CM2
ECHO AV AREA/BSA PEAK VELOCITY: 1 CM2/M2
ECHO AV AREA/BSA VTI: 1.1 CM2/M2
ECHO AV CUSP MM: 1.5 CM
ECHO AV MEAN GRADIENT: 8 MMHG
ECHO AV MEAN VELOCITY: 1.3 M/S
ECHO AV PEAK GRADIENT: 14 MMHG
ECHO AV PEAK VELOCITY: 1.8 M/S
ECHO AV REGURGITANT PHT: 592.8 MS
ECHO AV VELOCITY RATIO: 0.61
ECHO AV VTI: 41 CM
ECHO BSA: 1.73 M2
ECHO EST RA PRESSURE: 3 MMHG
ECHO LA DIAMETER INDEX: 2.37 CM/M2
ECHO LA DIAMETER: 4 CM
ECHO LA VOL A-L A2C: 51 ML (ref 22–52)
ECHO LA VOL A-L A4C: 44 ML (ref 22–52)
ECHO LA VOL MOD A2C: 50 ML (ref 22–52)
ECHO LA VOL MOD A4C: 39 ML (ref 22–52)
ECHO LA VOLUME AREA LENGTH: 51 ML
ECHO LA VOLUME INDEX A-L A2C: 30 ML/M2 (ref 16–34)
ECHO LA VOLUME INDEX A-L A4C: 26 ML/M2 (ref 16–34)
ECHO LA VOLUME INDEX AREA LENGTH: 30 ML/M2 (ref 16–34)
ECHO LA VOLUME INDEX MOD A2C: 30 ML/M2 (ref 16–34)
ECHO LA VOLUME INDEX MOD A4C: 23 ML/M2 (ref 16–34)
ECHO LV EF PHYSICIAN: 55 %
ECHO LV FRACTIONAL SHORTENING: 33 % (ref 28–44)
ECHO LV INTERNAL DIMENSION DIASTOLE INDEX: 2.84 CM/M2
ECHO LV INTERNAL DIMENSION DIASTOLIC: 4.8 CM (ref 3.9–5.3)
ECHO LV INTERNAL DIMENSION SYSTOLIC INDEX: 1.89 CM/M2
ECHO LV INTERNAL DIMENSION SYSTOLIC: 3.2 CM
ECHO LV ISOVOLUMETRIC RELAXATION TIME (IVRT): 101.5 MS
ECHO LV IVSD: 0.9 CM (ref 0.6–0.9)
ECHO LV IVSS: 1.3 CM
ECHO LV MASS 2D: 147.8 G (ref 67–162)
ECHO LV MASS INDEX 2D: 87.4 G/M2 (ref 43–95)
ECHO LV POSTERIOR WALL DIASTOLIC: 0.9 CM (ref 0.6–0.9)
ECHO LV POSTERIOR WALL SYSTOLIC: 1.3 CM
ECHO LV RELATIVE WALL THICKNESS RATIO: 0.38
ECHO LVOT AREA: 2.8 CM2
ECHO LVOT AV VTI INDEX: 0.63
ECHO LVOT DIAM: 1.9 CM
ECHO LVOT MEAN GRADIENT: 3 MMHG
ECHO LVOT PEAK GRADIENT: 5 MMHG
ECHO LVOT PEAK VELOCITY: 1.1 M/S
ECHO LVOT STROKE VOLUME INDEX: 43.4 ML/M2
ECHO LVOT SV: 73.4 ML
ECHO LVOT VTI: 25.9 CM
ECHO MV "A" WAVE DURATION: 120 MSEC
ECHO MV A VELOCITY: 0.57 M/S
ECHO MV AREA PHT: 2.8 CM2
ECHO MV AREA VTI: 3.9 CM2
ECHO MV E DECELERATION TIME (DT): 219.1 MS
ECHO MV E VELOCITY: 0.74 M/S
ECHO MV E/A RATIO: 1.3
ECHO MV LVOT VTI INDEX: 0.73
ECHO MV MAX VELOCITY: 0.8 M/S
ECHO MV MEAN GRADIENT: 1 MMHG
ECHO MV MEAN VELOCITY: 0.4 M/S
ECHO MV PEAK GRADIENT: 2 MMHG
ECHO MV PRESSURE HALF TIME (PHT): 79 MS
ECHO MV VTI: 18.9 CM
ECHO PULMONARY ARTERY SYSTOLIC PRESSURE (PASP): 39 MMHG
ECHO PV MAX VELOCITY: 0.8 M/S
ECHO PV MEAN GRADIENT: 2 MMHG
ECHO PV MEAN VELOCITY: 0.6 M/S
ECHO PV PEAK GRADIENT: 2 MMHG
ECHO PV VTI: 19.4 CM
ECHO PVEIN A DURATION: 138.4 MS
ECHO PVEIN A VELOCITY: 0.3 M/S
ECHO PVEIN PEAK D VELOCITY: 0.5 M/S
ECHO PVEIN PEAK S VELOCITY: 0.5 M/S
ECHO PVEIN S/D RATIO: 1
ECHO RIGHT VENTRICULAR SYSTOLIC PRESSURE (RVSP): 39 MMHG
ECHO RV INTERNAL DIMENSION: 2.9 CM
ECHO TV REGURGITANT MAX VELOCITY: 3 M/S
ECHO TV REGURGITANT PEAK GRADIENT: 36 MMHG
EOSINOPHIL # BLD: 0 K/UL (ref 0.05–0.5)
EOSINOPHILS RELATIVE PERCENT: 0 % (ref 0–6)
ERYTHROCYTE [DISTWIDTH] IN BLOOD BY AUTOMATED COUNT: 12.1 % (ref 11.5–15)
GFR, ESTIMATED: 69 ML/MIN/1.73M2
GLUCOSE BLD-MCNC: 154 MG/DL (ref 74–99)
GLUCOSE SERPL-MCNC: 153 MG/DL (ref 74–99)
HCT VFR BLD AUTO: 39.2 % (ref 34–48)
HGB BLD-MCNC: 13.2 G/DL (ref 11.5–15.5)
IMM GRANULOCYTES # BLD AUTO: 0.04 K/UL (ref 0–0.58)
IMM GRANULOCYTES NFR BLD: 0 % (ref 0–5)
L PNEUMO1 AG UR QL IA.RAPID: NEGATIVE
LYMPHOCYTES NFR BLD: 0.93 K/UL (ref 1.5–4)
LYMPHOCYTES RELATIVE PERCENT: 9 % (ref 20–42)
MCH RBC QN AUTO: 31.1 PG (ref 26–35)
MCHC RBC AUTO-ENTMCNC: 33.7 G/DL (ref 32–34.5)
MCV RBC AUTO: 92.5 FL (ref 80–99.9)
MONOCYTES NFR BLD: 0.17 K/UL (ref 0.1–0.95)
MONOCYTES NFR BLD: 2 % (ref 2–12)
NEUTROPHILS NFR BLD: 89 % (ref 43–80)
NEUTS SEG NFR BLD: 8.87 K/UL (ref 1.8–7.3)
PLATELET # BLD AUTO: 300 K/UL (ref 130–450)
PMV BLD AUTO: 9.4 FL (ref 7–12)
POTASSIUM SERPL-SCNC: 4.4 MMOL/L (ref 3.5–5)
RBC # BLD AUTO: 4.24 M/UL (ref 3.5–5.5)
S PNEUM AG SPEC QL: NEGATIVE
SODIUM SERPL-SCNC: 138 MMOL/L (ref 132–146)
SPECIMEN SOURCE: NORMAL
WBC OTHER # BLD: 10 K/UL (ref 4.5–11.5)

## 2025-01-31 PROCEDURE — 2500000003 HC RX 250 WO HCPCS: Performed by: NURSE PRACTITIONER

## 2025-01-31 PROCEDURE — 6370000000 HC RX 637 (ALT 250 FOR IP): Performed by: NURSE PRACTITIONER

## 2025-01-31 PROCEDURE — 93306 TTE W/DOPPLER COMPLETE: CPT

## 2025-01-31 PROCEDURE — 36415 COLL VENOUS BLD VENIPUNCTURE: CPT

## 2025-01-31 PROCEDURE — 94640 AIRWAY INHALATION TREATMENT: CPT

## 2025-01-31 PROCEDURE — 97530 THERAPEUTIC ACTIVITIES: CPT

## 2025-01-31 PROCEDURE — 2140000000 HC CCU INTERMEDIATE R&B

## 2025-01-31 PROCEDURE — 82962 GLUCOSE BLOOD TEST: CPT

## 2025-01-31 PROCEDURE — 2700000000 HC OXYGEN THERAPY PER DAY

## 2025-01-31 PROCEDURE — 85025 COMPLETE CBC W/AUTO DIFF WBC: CPT

## 2025-01-31 PROCEDURE — 93306 TTE W/DOPPLER COMPLETE: CPT | Performed by: INTERNAL MEDICINE

## 2025-01-31 PROCEDURE — 83880 ASSAY OF NATRIURETIC PEPTIDE: CPT

## 2025-01-31 PROCEDURE — 71045 X-RAY EXAM CHEST 1 VIEW: CPT

## 2025-01-31 PROCEDURE — 6360000002 HC RX W HCPCS: Performed by: NURSE PRACTITIONER

## 2025-01-31 PROCEDURE — 80048 BASIC METABOLIC PNL TOTAL CA: CPT

## 2025-01-31 RX ADMIN — IPRATROPIUM BROMIDE AND ALBUTEROL SULFATE 1 DOSE: 2.5; .5 SOLUTION RESPIRATORY (INHALATION) at 11:12

## 2025-01-31 RX ADMIN — WATER 40 MG: 1 INJECTION INTRAMUSCULAR; INTRAVENOUS; SUBCUTANEOUS at 16:25

## 2025-01-31 RX ADMIN — SODIUM CHLORIDE, PRESERVATIVE FREE 10 ML: 5 INJECTION INTRAVENOUS at 08:34

## 2025-01-31 RX ADMIN — CLOPIDOGREL BISULFATE 75 MG: 75 TABLET ORAL at 08:33

## 2025-01-31 RX ADMIN — DOXYCYCLINE HYCLATE 100 MG: 100 CAPSULE ORAL at 20:30

## 2025-01-31 RX ADMIN — SODIUM CHLORIDE, PRESERVATIVE FREE 10 ML: 5 INJECTION INTRAVENOUS at 16:25

## 2025-01-31 RX ADMIN — IPRATROPIUM BROMIDE AND ALBUTEROL SULFATE 1 DOSE: 2.5; .5 SOLUTION RESPIRATORY (INHALATION) at 19:26

## 2025-01-31 RX ADMIN — LEVOTHYROXINE SODIUM 25 MCG: 0.05 TABLET ORAL at 08:33

## 2025-01-31 RX ADMIN — DOXYCYCLINE HYCLATE 100 MG: 100 CAPSULE ORAL at 08:33

## 2025-01-31 RX ADMIN — SODIUM CHLORIDE, PRESERVATIVE FREE 10 ML: 5 INJECTION INTRAVENOUS at 20:30

## 2025-01-31 RX ADMIN — ONDANSETRON 4 MG: 2 INJECTION, SOLUTION INTRAMUSCULAR; INTRAVENOUS at 20:29

## 2025-01-31 RX ADMIN — IPRATROPIUM BROMIDE AND ALBUTEROL SULFATE 1 DOSE: 2.5; .5 SOLUTION RESPIRATORY (INHALATION) at 15:19

## 2025-01-31 RX ADMIN — ENOXAPARIN SODIUM 40 MG: 100 INJECTION SUBCUTANEOUS at 08:33

## 2025-01-31 RX ADMIN — WATER 1000 MG: 1 INJECTION INTRAMUSCULAR; INTRAVENOUS; SUBCUTANEOUS at 20:29

## 2025-01-31 RX ADMIN — WATER 40 MG: 1 INJECTION INTRAMUSCULAR; INTRAVENOUS; SUBCUTANEOUS at 05:33

## 2025-01-31 RX ADMIN — EZETIMIBE 10 MG: 10 TABLET ORAL at 08:33

## 2025-01-31 NOTE — PLAN OF CARE
Problem: Chronic Conditions and Co-morbidities  Goal: Patient's chronic conditions and co-morbidity symptoms are monitored and maintained or improved  Outcome: Progressing  Flowsheets (Taken 1/30/2025 2031)  Care Plan - Patient's Chronic Conditions and Co-Morbidity Symptoms are Monitored and Maintained or Improved: Monitor and assess patient's chronic conditions and comorbid symptoms for stability, deterioration, or improvement     Problem: Discharge Planning  Goal: Discharge to home or other facility with appropriate resources  Outcome: Progressing  Flowsheets (Taken 1/30/2025 2031)  Discharge to home or other facility with appropriate resources: Identify barriers to discharge with patient and caregiver     Problem: Safety - Adult  Goal: Free from fall injury  Outcome: Progressing     Problem: ABCDS Injury Assessment  Goal: Absence of physical injury  Outcome: Progressing

## 2025-01-31 NOTE — CARE COORDINATION
Social Work/ Case Management Transition of Care Planning (Ingrid Fisher, SHARW [phone]):     Per report and chart review Pt is on 1L NC, 0 at baseline. Pt on IV Solu-medrol q8, IV Rocephin q24. Pt pending Echo. SW sent referral for home O2 to [facility]. Pt plans to discharge home where her son assists her, Pt's son, Angelo will transport home. HERNANDO/CHRISTIAN to follow.  ALEXANDER Michaud  1/31/2025

## 2025-02-01 PROBLEM — E03.9 HYPOTHYROID: Status: ACTIVE | Noted: 2025-02-01

## 2025-02-01 LAB
ANION GAP SERPL CALCULATED.3IONS-SCNC: 11 MMOL/L (ref 7–16)
BASOPHILS # BLD: 0.02 K/UL (ref 0–0.2)
BASOPHILS NFR BLD: 0 % (ref 0–2)
BUN SERPL-MCNC: 35 MG/DL (ref 6–23)
CALCIUM SERPL-MCNC: 9 MG/DL (ref 8.6–10.2)
CHLORIDE SERPL-SCNC: 105 MMOL/L (ref 98–107)
CO2 SERPL-SCNC: 24 MMOL/L (ref 22–29)
CREAT SERPL-MCNC: 1.1 MG/DL (ref 0.5–1)
EOSINOPHIL # BLD: 0 K/UL (ref 0.05–0.5)
EOSINOPHILS RELATIVE PERCENT: 0 % (ref 0–6)
ERYTHROCYTE [DISTWIDTH] IN BLOOD BY AUTOMATED COUNT: 12.2 % (ref 11.5–15)
GFR, ESTIMATED: 47 ML/MIN/1.73M2
GLUCOSE SERPL-MCNC: 143 MG/DL (ref 74–99)
HCT VFR BLD AUTO: 38.9 % (ref 34–48)
HGB BLD-MCNC: 12.8 G/DL (ref 11.5–15.5)
IMM GRANULOCYTES # BLD AUTO: 0.13 K/UL (ref 0–0.58)
IMM GRANULOCYTES NFR BLD: 1 % (ref 0–5)
LYMPHOCYTES NFR BLD: 1.02 K/UL (ref 1.5–4)
LYMPHOCYTES RELATIVE PERCENT: 5 % (ref 20–42)
MCH RBC QN AUTO: 31 PG (ref 26–35)
MCHC RBC AUTO-ENTMCNC: 32.9 G/DL (ref 32–34.5)
MCV RBC AUTO: 94.2 FL (ref 80–99.9)
MONOCYTES NFR BLD: 0.84 K/UL (ref 0.1–0.95)
MONOCYTES NFR BLD: 4 % (ref 2–12)
NEUTROPHILS NFR BLD: 89 % (ref 43–80)
NEUTS SEG NFR BLD: 16.95 K/UL (ref 1.8–7.3)
PLATELET # BLD AUTO: 337 K/UL (ref 130–450)
PMV BLD AUTO: 9.6 FL (ref 7–12)
POTASSIUM SERPL-SCNC: 4.9 MMOL/L (ref 3.5–5)
RBC # BLD AUTO: 4.13 M/UL (ref 3.5–5.5)
SODIUM SERPL-SCNC: 140 MMOL/L (ref 132–146)
WBC OTHER # BLD: 19 K/UL (ref 4.5–11.5)

## 2025-02-01 PROCEDURE — 2140000000 HC CCU INTERMEDIATE R&B

## 2025-02-01 PROCEDURE — 36415 COLL VENOUS BLD VENIPUNCTURE: CPT

## 2025-02-01 PROCEDURE — 6360000002 HC RX W HCPCS: Performed by: NURSE PRACTITIONER

## 2025-02-01 PROCEDURE — 2500000003 HC RX 250 WO HCPCS: Performed by: NURSE PRACTITIONER

## 2025-02-01 PROCEDURE — 6370000000 HC RX 637 (ALT 250 FOR IP): Performed by: NURSE PRACTITIONER

## 2025-02-01 PROCEDURE — 85025 COMPLETE CBC W/AUTO DIFF WBC: CPT

## 2025-02-01 PROCEDURE — 80048 BASIC METABOLIC PNL TOTAL CA: CPT

## 2025-02-01 PROCEDURE — 94640 AIRWAY INHALATION TREATMENT: CPT

## 2025-02-01 PROCEDURE — 2700000000 HC OXYGEN THERAPY PER DAY

## 2025-02-01 RX ORDER — ENOXAPARIN SODIUM 100 MG/ML
30 INJECTION SUBCUTANEOUS DAILY
Status: DISCONTINUED | OUTPATIENT
Start: 2025-02-02 | End: 2025-02-04 | Stop reason: HOSPADM

## 2025-02-01 RX ADMIN — CLOPIDOGREL BISULFATE 75 MG: 75 TABLET ORAL at 11:58

## 2025-02-01 RX ADMIN — DOXYCYCLINE HYCLATE 100 MG: 100 CAPSULE ORAL at 11:57

## 2025-02-01 RX ADMIN — METHYLPREDNISOLONE SODIUM SUCCINATE 40 MG: 40 INJECTION, POWDER, LYOPHILIZED, FOR SOLUTION INTRAMUSCULAR; INTRAVENOUS at 04:32

## 2025-02-01 RX ADMIN — ENOXAPARIN SODIUM 40 MG: 100 INJECTION SUBCUTANEOUS at 11:58

## 2025-02-01 RX ADMIN — LEVOTHYROXINE SODIUM 25 MCG: 0.05 TABLET ORAL at 06:58

## 2025-02-01 RX ADMIN — EZETIMIBE 10 MG: 10 TABLET ORAL at 11:57

## 2025-02-01 RX ADMIN — ONDANSETRON 4 MG: 2 INJECTION, SOLUTION INTRAMUSCULAR; INTRAVENOUS at 15:56

## 2025-02-01 RX ADMIN — IPRATROPIUM BROMIDE AND ALBUTEROL SULFATE 1 DOSE: 2.5; .5 SOLUTION RESPIRATORY (INHALATION) at 12:12

## 2025-02-01 RX ADMIN — SODIUM CHLORIDE, PRESERVATIVE FREE 10 ML: 5 INJECTION INTRAVENOUS at 21:24

## 2025-02-01 RX ADMIN — METHYLPREDNISOLONE SODIUM SUCCINATE 40 MG: 40 INJECTION, POWDER, LYOPHILIZED, FOR SOLUTION INTRAMUSCULAR; INTRAVENOUS at 15:53

## 2025-02-01 RX ADMIN — SODIUM CHLORIDE, PRESERVATIVE FREE 10 ML: 5 INJECTION INTRAVENOUS at 11:59

## 2025-02-01 RX ADMIN — DOXYCYCLINE HYCLATE 100 MG: 100 CAPSULE ORAL at 21:23

## 2025-02-01 RX ADMIN — IPRATROPIUM BROMIDE AND ALBUTEROL SULFATE 1 DOSE: 2.5; .5 SOLUTION RESPIRATORY (INHALATION) at 20:19

## 2025-02-01 RX ADMIN — IPRATROPIUM BROMIDE AND ALBUTEROL SULFATE 1 DOSE: 2.5; .5 SOLUTION RESPIRATORY (INHALATION) at 16:47

## 2025-02-01 RX ADMIN — WATER 1000 MG: 1 INJECTION INTRAMUSCULAR; INTRAVENOUS; SUBCUTANEOUS at 21:20

## 2025-02-01 NOTE — PLAN OF CARE
Problem: Chronic Conditions and Co-morbidities  Goal: Patient's chronic conditions and co-morbidity symptoms are monitored and maintained or improved  Outcome: Progressing     Problem: Discharge Planning  Goal: Discharge to home or other facility with appropriate resources  Outcome: Progressing     Problem: Safety - Adult  Goal: Free from fall injury  Outcome: Progressing  Flowsheets (Taken 2/1/2025 0110)  Free From Fall Injury: Instruct family/caregiver on patient safety     Problem: ABCDS Injury Assessment  Goal: Absence of physical injury  Outcome: Progressing  Flowsheets (Taken 2/1/2025 0110)  Absence of Physical Injury: Implement safety measures based on patient assessment

## 2025-02-01 NOTE — PLAN OF CARE
Problem: Chronic Conditions and Co-morbidities  Goal: Patient's chronic conditions and co-morbidity symptoms are monitored and maintained or improved  2/1/2025 1319 by Tricia Plata RN  Outcome: Progressing  2/1/2025 0111 by Sunil Pride RN  Outcome: Progressing  Flowsheets (Taken 1/31/2025 2005)  Care Plan - Patient's Chronic Conditions and Co-Morbidity Symptoms are Monitored and Maintained or Improved:   Monitor and assess patient's chronic conditions and comorbid symptoms for stability, deterioration, or improvement   Collaborate with multidisciplinary team to address chronic and comorbid conditions and prevent exacerbation or deterioration   Update acute care plan with appropriate goals if chronic or comorbid symptoms are exacerbated and prevent overall improvement and discharge     Problem: Discharge Planning  Goal: Discharge to home or other facility with appropriate resources  2/1/2025 1319 by Tricia Plata RN  Outcome: Progressing  2/1/2025 0111 by Sunil Pride RN  Outcome: Progressing  Flowsheets (Taken 1/31/2025 2005)  Discharge to home or other facility with appropriate resources:   Identify barriers to discharge with patient and caregiver   Arrange for needed discharge resources and transportation as appropriate     Problem: Safety - Adult  Goal: Free from fall injury  2/1/2025 1319 by Tricia Plata RN  Outcome: Progressing  2/1/2025 0111 by Sunil Pride RN  Outcome: Progressing  Flowsheets (Taken 2/1/2025 0110)  Free From Fall Injury: Instruct family/caregiver on patient safety     Problem: ABCDS Injury Assessment  Goal: Absence of physical injury  2/1/2025 1319 by Tricia Plata RN  Outcome: Progressing  2/1/2025 0111 by Sunil Pride RN  Outcome: Progressing  Flowsheets (Taken 2/1/2025 0110)  Absence of Physical Injury: Implement safety measures based on patient assessment

## 2025-02-02 LAB
ANION GAP SERPL CALCULATED.3IONS-SCNC: 14 MMOL/L (ref 7–16)
BASOPHILS # BLD: 0.02 K/UL (ref 0–0.2)
BASOPHILS NFR BLD: 0 % (ref 0–2)
BUN SERPL-MCNC: 42 MG/DL (ref 6–23)
CALCIUM SERPL-MCNC: 9 MG/DL (ref 8.6–10.2)
CHLORIDE SERPL-SCNC: 104 MMOL/L (ref 98–107)
CO2 SERPL-SCNC: 23 MMOL/L (ref 22–29)
CREAT SERPL-MCNC: 1.2 MG/DL (ref 0.5–1)
EOSINOPHIL # BLD: 0 K/UL (ref 0.05–0.5)
EOSINOPHILS RELATIVE PERCENT: 0 % (ref 0–6)
ERYTHROCYTE [DISTWIDTH] IN BLOOD BY AUTOMATED COUNT: 12.4 % (ref 11.5–15)
GFR, ESTIMATED: 40 ML/MIN/1.73M2
GLUCOSE SERPL-MCNC: 111 MG/DL (ref 74–99)
HCT VFR BLD AUTO: 39.6 % (ref 34–48)
HGB BLD-MCNC: 12.7 G/DL (ref 11.5–15.5)
IMM GRANULOCYTES # BLD AUTO: 0.08 K/UL (ref 0–0.58)
IMM GRANULOCYTES NFR BLD: 1 % (ref 0–5)
LYMPHOCYTES NFR BLD: 1.24 K/UL (ref 1.5–4)
LYMPHOCYTES RELATIVE PERCENT: 9 % (ref 20–42)
MCH RBC QN AUTO: 31.1 PG (ref 26–35)
MCHC RBC AUTO-ENTMCNC: 32.1 G/DL (ref 32–34.5)
MCV RBC AUTO: 97.1 FL (ref 80–99.9)
MICROORGANISM SPEC CULT: NORMAL
MICROORGANISM SPEC CULT: NORMAL
MONOCYTES NFR BLD: 0.9 K/UL (ref 0.1–0.95)
MONOCYTES NFR BLD: 6 % (ref 2–12)
NEUTROPHILS NFR BLD: 85 % (ref 43–80)
NEUTS SEG NFR BLD: 12.35 K/UL (ref 1.8–7.3)
PLATELET # BLD AUTO: 331 K/UL (ref 130–450)
PMV BLD AUTO: 9.5 FL (ref 7–12)
POTASSIUM SERPL-SCNC: 4.5 MMOL/L (ref 3.5–5)
RBC # BLD AUTO: 4.08 M/UL (ref 3.5–5.5)
SERVICE CMNT-IMP: NORMAL
SERVICE CMNT-IMP: NORMAL
SODIUM SERPL-SCNC: 141 MMOL/L (ref 132–146)
SPECIMEN DESCRIPTION: NORMAL
SPECIMEN DESCRIPTION: NORMAL
WBC OTHER # BLD: 14.6 K/UL (ref 4.5–11.5)

## 2025-02-02 PROCEDURE — 6360000002 HC RX W HCPCS: Performed by: NURSE PRACTITIONER

## 2025-02-02 PROCEDURE — 94640 AIRWAY INHALATION TREATMENT: CPT

## 2025-02-02 PROCEDURE — 80048 BASIC METABOLIC PNL TOTAL CA: CPT

## 2025-02-02 PROCEDURE — 36415 COLL VENOUS BLD VENIPUNCTURE: CPT

## 2025-02-02 PROCEDURE — 2500000003 HC RX 250 WO HCPCS: Performed by: NURSE PRACTITIONER

## 2025-02-02 PROCEDURE — 2700000000 HC OXYGEN THERAPY PER DAY

## 2025-02-02 PROCEDURE — 85025 COMPLETE CBC W/AUTO DIFF WBC: CPT

## 2025-02-02 PROCEDURE — 6370000000 HC RX 637 (ALT 250 FOR IP): Performed by: NURSE PRACTITIONER

## 2025-02-02 PROCEDURE — 2140000000 HC CCU INTERMEDIATE R&B

## 2025-02-02 RX ADMIN — IPRATROPIUM BROMIDE AND ALBUTEROL SULFATE 1 DOSE: 2.5; .5 SOLUTION RESPIRATORY (INHALATION) at 20:21

## 2025-02-02 RX ADMIN — DOXYCYCLINE HYCLATE 100 MG: 100 CAPSULE ORAL at 20:52

## 2025-02-02 RX ADMIN — METHYLPREDNISOLONE SODIUM SUCCINATE 40 MG: 40 INJECTION, POWDER, LYOPHILIZED, FOR SOLUTION INTRAMUSCULAR; INTRAVENOUS at 11:40

## 2025-02-02 RX ADMIN — DOXYCYCLINE HYCLATE 100 MG: 100 CAPSULE ORAL at 09:09

## 2025-02-02 RX ADMIN — SODIUM CHLORIDE, PRESERVATIVE FREE 10 ML: 5 INJECTION INTRAVENOUS at 11:41

## 2025-02-02 RX ADMIN — IPRATROPIUM BROMIDE AND ALBUTEROL SULFATE 1 DOSE: 2.5; .5 SOLUTION RESPIRATORY (INHALATION) at 16:36

## 2025-02-02 RX ADMIN — SODIUM CHLORIDE, PRESERVATIVE FREE 10 ML: 5 INJECTION INTRAVENOUS at 20:52

## 2025-02-02 RX ADMIN — CLOPIDOGREL BISULFATE 75 MG: 75 TABLET ORAL at 09:09

## 2025-02-02 RX ADMIN — SODIUM CHLORIDE, PRESERVATIVE FREE 10 ML: 5 INJECTION INTRAVENOUS at 09:12

## 2025-02-02 RX ADMIN — POLYVINYL ALCOHOL, POVIDONE 1 DROP: 14; 6 SOLUTION/ DROPS OPHTHALMIC at 15:28

## 2025-02-02 RX ADMIN — ENOXAPARIN SODIUM 30 MG: 100 INJECTION SUBCUTANEOUS at 09:10

## 2025-02-02 RX ADMIN — WATER 1000 MG: 1 INJECTION INTRAMUSCULAR; INTRAVENOUS; SUBCUTANEOUS at 20:52

## 2025-02-02 NOTE — PLAN OF CARE
Problem: Chronic Conditions and Co-morbidities  Goal: Patient's chronic conditions and co-morbidity symptoms are monitored and maintained or improved  2/2/2025 0121 by Heather Montelongo RN  Outcome: Progressing  2/1/2025 1319 by Tricia Plata RN  Outcome: Progressing     Problem: Discharge Planning  Goal: Discharge to home or other facility with appropriate resources  2/2/2025 0121 by Heather Montelongo RN  Outcome: Progressing  2/1/2025 1319 by Tricia Plata RN  Outcome: Progressing     Problem: Safety - Adult  Goal: Free from fall injury  2/2/2025 0121 by Heather Montelongo RN  Outcome: Progressing  2/1/2025 1319 by Tricia Plata RN  Outcome: Progressing     Problem: ABCDS Injury Assessment  Goal: Absence of physical injury  2/2/2025 0121 by Heather Montelongo RN  Outcome: Progressing  2/1/2025 1319 by Tricia Plata RN  Outcome: Progressing

## 2025-02-02 NOTE — PLAN OF CARE
Problem: Chronic Conditions and Co-morbidities  Goal: Patient's chronic conditions and co-morbidity symptoms are monitored and maintained or improved  2/2/2025 1101 by Tricia Plata RN  Outcome: Progressing  2/2/2025 0121 by Heather Montelongo RN  Outcome: Progressing     Problem: Discharge Planning  Goal: Discharge to home or other facility with appropriate resources  2/2/2025 1101 by Tricia Plata RN  Outcome: Progressing  2/2/2025 0121 by Heather Montelongo RN  Outcome: Progressing     Problem: Safety - Adult  Goal: Free from fall injury  2/2/2025 1101 by Tricia Plata RN  Outcome: Progressing  2/2/2025 0121 by Heather Montelongo RN  Outcome: Progressing     Problem: ABCDS Injury Assessment  Goal: Absence of physical injury  2/2/2025 1101 by Tricia Plata RN  Outcome: Progressing  2/2/2025 0121 by Heather Montelongo RN  Outcome: Progressing

## 2025-02-03 ENCOUNTER — APPOINTMENT (OUTPATIENT)
Dept: GENERAL RADIOLOGY | Age: 89
DRG: 193 | End: 2025-02-03
Payer: MEDICARE

## 2025-02-03 LAB
ANION GAP SERPL CALCULATED.3IONS-SCNC: 17 MMOL/L (ref 7–16)
ANION GAP SERPL CALCULATED.3IONS-SCNC: 8 MMOL/L (ref 7–16)
BASOPHILS # BLD: 0.01 K/UL (ref 0–0.2)
BASOPHILS NFR BLD: 0 % (ref 0–2)
BNP SERPL-MCNC: 2420 PG/ML (ref 0–450)
BUN SERPL-MCNC: 43 MG/DL (ref 6–23)
BUN SERPL-MCNC: 47 MG/DL (ref 6–23)
CALCIUM SERPL-MCNC: 9 MG/DL (ref 8.6–10.2)
CALCIUM SERPL-MCNC: 9.6 MG/DL (ref 8.6–10.2)
CHLORIDE SERPL-SCNC: 107 MMOL/L (ref 98–107)
CHLORIDE SERPL-SCNC: 117 MMOL/L (ref 98–107)
CO2 SERPL-SCNC: 21 MMOL/L (ref 22–29)
CO2 SERPL-SCNC: 27 MMOL/L (ref 22–29)
CREAT SERPL-MCNC: 1.2 MG/DL (ref 0.5–1)
CREAT SERPL-MCNC: 1.2 MG/DL (ref 0.5–1)
EOSINOPHIL # BLD: 0 K/UL (ref 0.05–0.5)
EOSINOPHILS RELATIVE PERCENT: 0 % (ref 0–6)
ERYTHROCYTE [DISTWIDTH] IN BLOOD BY AUTOMATED COUNT: 12.1 % (ref 11.5–15)
GFR, ESTIMATED: 40 ML/MIN/1.73M2
GFR, ESTIMATED: 42 ML/MIN/1.73M2
GLUCOSE SERPL-MCNC: 112 MG/DL (ref 74–99)
GLUCOSE SERPL-MCNC: 87 MG/DL (ref 74–99)
HCT VFR BLD AUTO: 37.6 % (ref 34–48)
HGB BLD-MCNC: 12.4 G/DL (ref 11.5–15.5)
IMM GRANULOCYTES # BLD AUTO: 0.08 K/UL (ref 0–0.58)
IMM GRANULOCYTES NFR BLD: 1 % (ref 0–5)
LYMPHOCYTES NFR BLD: 1.48 K/UL (ref 1.5–4)
LYMPHOCYTES RELATIVE PERCENT: 13 % (ref 20–42)
MCH RBC QN AUTO: 31.5 PG (ref 26–35)
MCHC RBC AUTO-ENTMCNC: 33 G/DL (ref 32–34.5)
MCV RBC AUTO: 95.4 FL (ref 80–99.9)
MONOCYTES NFR BLD: 1.02 K/UL (ref 0.1–0.95)
MONOCYTES NFR BLD: 9 % (ref 2–12)
NEUTROPHILS NFR BLD: 77 % (ref 43–80)
NEUTS SEG NFR BLD: 8.79 K/UL (ref 1.8–7.3)
PLATELET # BLD AUTO: 314 K/UL (ref 130–450)
PMV BLD AUTO: 9.5 FL (ref 7–12)
POTASSIUM SERPL-SCNC: 4.3 MMOL/L (ref 3.5–5)
POTASSIUM SERPL-SCNC: 4.8 MMOL/L (ref 3.5–5)
RBC # BLD AUTO: 3.94 M/UL (ref 3.5–5.5)
SODIUM SERPL-SCNC: 142 MMOL/L (ref 132–146)
SODIUM SERPL-SCNC: 155 MMOL/L (ref 132–146)
WBC OTHER # BLD: 11.4 K/UL (ref 4.5–11.5)

## 2025-02-03 PROCEDURE — 2140000000 HC CCU INTERMEDIATE R&B

## 2025-02-03 PROCEDURE — 6370000000 HC RX 637 (ALT 250 FOR IP): Performed by: NURSE PRACTITIONER

## 2025-02-03 PROCEDURE — 83880 ASSAY OF NATRIURETIC PEPTIDE: CPT

## 2025-02-03 PROCEDURE — 2700000000 HC OXYGEN THERAPY PER DAY

## 2025-02-03 PROCEDURE — 6360000002 HC RX W HCPCS: Performed by: NURSE PRACTITIONER

## 2025-02-03 PROCEDURE — 97530 THERAPEUTIC ACTIVITIES: CPT

## 2025-02-03 PROCEDURE — 71045 X-RAY EXAM CHEST 1 VIEW: CPT

## 2025-02-03 PROCEDURE — 2500000003 HC RX 250 WO HCPCS: Performed by: NURSE PRACTITIONER

## 2025-02-03 PROCEDURE — 36415 COLL VENOUS BLD VENIPUNCTURE: CPT

## 2025-02-03 PROCEDURE — 97535 SELF CARE MNGMENT TRAINING: CPT

## 2025-02-03 PROCEDURE — 80048 BASIC METABOLIC PNL TOTAL CA: CPT

## 2025-02-03 PROCEDURE — 94640 AIRWAY INHALATION TREATMENT: CPT

## 2025-02-03 PROCEDURE — 85025 COMPLETE CBC W/AUTO DIFF WBC: CPT

## 2025-02-03 RX ADMIN — IPRATROPIUM BROMIDE AND ALBUTEROL SULFATE 1 DOSE: 2.5; .5 SOLUTION RESPIRATORY (INHALATION) at 09:05

## 2025-02-03 RX ADMIN — IPRATROPIUM BROMIDE AND ALBUTEROL SULFATE 1 DOSE: 2.5; .5 SOLUTION RESPIRATORY (INHALATION) at 19:15

## 2025-02-03 RX ADMIN — LEVOTHYROXINE SODIUM 25 MCG: 0.05 TABLET ORAL at 05:48

## 2025-02-03 RX ADMIN — ENOXAPARIN SODIUM 30 MG: 100 INJECTION SUBCUTANEOUS at 09:00

## 2025-02-03 RX ADMIN — METHYLPREDNISOLONE SODIUM SUCCINATE 40 MG: 40 INJECTION, POWDER, LYOPHILIZED, FOR SOLUTION INTRAMUSCULAR; INTRAVENOUS at 09:00

## 2025-02-03 RX ADMIN — SODIUM CHLORIDE, PRESERVATIVE FREE 10 ML: 5 INJECTION INTRAVENOUS at 20:30

## 2025-02-03 RX ADMIN — CLOPIDOGREL BISULFATE 75 MG: 75 TABLET ORAL at 08:59

## 2025-02-03 RX ADMIN — EZETIMIBE 10 MG: 10 TABLET ORAL at 08:59

## 2025-02-03 RX ADMIN — SODIUM CHLORIDE, PRESERVATIVE FREE 10 ML: 5 INJECTION INTRAVENOUS at 09:01

## 2025-02-03 RX ADMIN — IPRATROPIUM BROMIDE AND ALBUTEROL SULFATE 1 DOSE: 2.5; .5 SOLUTION RESPIRATORY (INHALATION) at 11:44

## 2025-02-03 NOTE — PLAN OF CARE
Problem: Chronic Conditions and Co-morbidities  Goal: Patient's chronic conditions and co-morbidity symptoms are monitored and maintained or improved  2/3/2025 1010 by Heather Montelongo RN  Outcome: Progressing  Flowsheets (Taken 2/3/2025 0900)  Care Plan - Patient's Chronic Conditions and Co-Morbidity Symptoms are Monitored and Maintained or Improved: Monitor and assess patient's chronic conditions and comorbid symptoms for stability, deterioration, or improvement  2/2/2025 2145 by Sunil Pride RN  Outcome: Progressing  Flowsheets (Taken 2/2/2025 2105)  Care Plan - Patient's Chronic Conditions and Co-Morbidity Symptoms are Monitored and Maintained or Improved:   Monitor and assess patient's chronic conditions and comorbid symptoms for stability, deterioration, or improvement   Collaborate with multidisciplinary team to address chronic and comorbid conditions and prevent exacerbation or deterioration   Update acute care plan with appropriate goals if chronic or comorbid symptoms are exacerbated and prevent overall improvement and discharge     Problem: Discharge Planning  Goal: Discharge to home or other facility with appropriate resources  2/3/2025 1010 by Heather Montelongo RN  Outcome: Progressing  Flowsheets (Taken 2/3/2025 0900)  Discharge to home or other facility with appropriate resources: Identify barriers to discharge with patient and caregiver  2/2/2025 2145 by Sunil Pride RN  Outcome: Progressing  Flowsheets (Taken 2/2/2025 2105)  Discharge to home or other facility with appropriate resources:   Identify barriers to discharge with patient and caregiver   Arrange for needed discharge resources and transportation as appropriate     Problem: Safety - Adult  Goal: Free from fall injury  2/3/2025 1010 by Heather Montelongo, RN  Outcome: Progressing  Flowsheets (Taken 2/2/2025 2229 by Sunil Pride RN)  Free From Fall Injury: Instruct family/caregiver on patient safety  2/2/2025 2145 by Bigg

## 2025-02-03 NOTE — CARE COORDINATION
Social Work/ Case Management Transition of Care Planning (Ingrid Fisher, ALEXANDER [phone]):     Per report and chart review Pt on 1L NC. Pts BNP raised from 1,642 to 2,420. PT 17/24, OT 15/24.  Pulse ox testing pending.  SW sent referral for home O2 to [facility]. Pt plans to discharge home where her son assists her, Pt's son, Angelo will transport home. HERNANDO/CHRISTIAN to follow.  ALEXANDER Michaud  2/3/2025

## 2025-02-03 NOTE — PLAN OF CARE
Problem: Chronic Conditions and Co-morbidities  Goal: Patient's chronic conditions and co-morbidity symptoms are monitored and maintained or improved  2/2/2025 2145 by Sunil Pride RN  Outcome: Progressing  2/2/2025 1101 by Tricia Plata RN  Outcome: Progressing     Problem: Discharge Planning  Goal: Discharge to home or other facility with appropriate resources  2/2/2025 2145 by Sunil Pride RN  Outcome: Progressing  2/2/2025 1101 by Tricia Plata RN  Outcome: Progressing     Problem: Safety - Adult  Goal: Free from fall injury  2/2/2025 2145 by Sunil Pride RN  Outcome: Progressing  2/2/2025 1101 by Tricia Plata RN  Outcome: Progressing     Problem: ABCDS Injury Assessment  Goal: Absence of physical injury  2/2/2025 2145 by Sunil Pride RN  Outcome: Progressing  2/2/2025 1101 by Tricia Plata RN  Outcome: Progressing

## 2025-02-04 VITALS
HEART RATE: 76 BPM | SYSTOLIC BLOOD PRESSURE: 127 MMHG | TEMPERATURE: 97.9 F | RESPIRATION RATE: 20 BRPM | OXYGEN SATURATION: 96 % | BODY MASS INDEX: 28.29 KG/M2 | WEIGHT: 153.7 LBS | DIASTOLIC BLOOD PRESSURE: 55 MMHG | HEIGHT: 62 IN

## 2025-02-04 LAB
ANION GAP SERPL CALCULATED.3IONS-SCNC: 12 MMOL/L (ref 7–16)
BUN SERPL-MCNC: 35 MG/DL (ref 6–23)
CALCIUM SERPL-MCNC: 9 MG/DL (ref 8.6–10.2)
CHLORIDE SERPL-SCNC: 109 MMOL/L (ref 98–107)
CO2 SERPL-SCNC: 24 MMOL/L (ref 22–29)
CREAT SERPL-MCNC: 1.1 MG/DL (ref 0.5–1)
GFR, ESTIMATED: 45 ML/MIN/1.73M2
GLUCOSE SERPL-MCNC: 92 MG/DL (ref 74–99)
POTASSIUM SERPL-SCNC: 4 MMOL/L (ref 3.5–5)
SODIUM SERPL-SCNC: 145 MMOL/L (ref 132–146)

## 2025-02-04 PROCEDURE — 94640 AIRWAY INHALATION TREATMENT: CPT

## 2025-02-04 PROCEDURE — 6370000000 HC RX 637 (ALT 250 FOR IP): Performed by: NURSE PRACTITIONER

## 2025-02-04 PROCEDURE — 80048 BASIC METABOLIC PNL TOTAL CA: CPT

## 2025-02-04 PROCEDURE — 36415 COLL VENOUS BLD VENIPUNCTURE: CPT

## 2025-02-04 PROCEDURE — 6360000002 HC RX W HCPCS: Performed by: NURSE PRACTITIONER

## 2025-02-04 PROCEDURE — 2500000003 HC RX 250 WO HCPCS: Performed by: NURSE PRACTITIONER

## 2025-02-04 PROCEDURE — 2700000000 HC OXYGEN THERAPY PER DAY

## 2025-02-04 RX ORDER — CEFDINIR 300 MG/1
300 CAPSULE ORAL 2 TIMES DAILY
Qty: 4 CAPSULE | Refills: 0 | Status: SHIPPED | OUTPATIENT
Start: 2025-02-04 | End: 2025-02-06

## 2025-02-04 RX ORDER — DOXYCYCLINE HYCLATE 100 MG
100 TABLET ORAL 2 TIMES DAILY
Qty: 6 TABLET | Refills: 0 | Status: SHIPPED | OUTPATIENT
Start: 2025-02-04 | End: 2025-02-07

## 2025-02-04 RX ORDER — IPRATROPIUM BROMIDE AND ALBUTEROL SULFATE 2.5; .5 MG/3ML; MG/3ML
3 SOLUTION RESPIRATORY (INHALATION)
Qty: 360 ML | Refills: 0 | Status: SHIPPED | OUTPATIENT
Start: 2025-02-04

## 2025-02-04 RX ORDER — PREDNISONE 20 MG/1
20 TABLET ORAL 2 TIMES DAILY
Qty: 10 TABLET | Refills: 0 | Status: SHIPPED | OUTPATIENT
Start: 2025-02-04 | End: 2025-02-09

## 2025-02-04 RX ADMIN — IPRATROPIUM BROMIDE AND ALBUTEROL SULFATE 1 DOSE: 2.5; .5 SOLUTION RESPIRATORY (INHALATION) at 12:06

## 2025-02-04 RX ADMIN — SODIUM CHLORIDE, PRESERVATIVE FREE 10 ML: 5 INJECTION INTRAVENOUS at 09:15

## 2025-02-04 RX ADMIN — CLOPIDOGREL BISULFATE 75 MG: 75 TABLET ORAL at 09:15

## 2025-02-04 RX ADMIN — LEVOTHYROXINE SODIUM 25 MCG: 0.05 TABLET ORAL at 06:12

## 2025-02-04 RX ADMIN — IPRATROPIUM BROMIDE AND ALBUTEROL SULFATE 1 DOSE: 2.5; .5 SOLUTION RESPIRATORY (INHALATION) at 08:34

## 2025-02-04 RX ADMIN — ENOXAPARIN SODIUM 30 MG: 100 INJECTION SUBCUTANEOUS at 09:15

## 2025-02-04 NOTE — PLAN OF CARE
Problem: Chronic Conditions and Co-morbidities  Goal: Patient's chronic conditions and co-morbidity symptoms are monitored and maintained or improved  2/3/2025 2359 by Sunil Pride RN  Outcome: Progressing  2/3/2025 1010 by Heather Montelongo RN  Outcome: Progressing  Flowsheets (Taken 2/3/2025 0900)  Care Plan - Patient's Chronic Conditions and Co-Morbidity Symptoms are Monitored and Maintained or Improved: Monitor and assess patient's chronic conditions and comorbid symptoms for stability, deterioration, or improvement     Problem: Discharge Planning  Goal: Discharge to home or other facility with appropriate resources  2/3/2025 2359 by Sunil Pride RN  Outcome: Progressing  2/3/2025 1010 by Heather Montelongo RN  Outcome: Progressing  Flowsheets (Taken 2/3/2025 0900)  Discharge to home or other facility with appropriate resources: Identify barriers to discharge with patient and caregiver     Problem: Safety - Adult  Goal: Free from fall injury  2/3/2025 2359 by Sunil Pride RN  Outcome: Progressing  2/3/2025 1010 by Heather Montelongo RN  Outcome: Progressing  Flowsheets (Taken 2/2/2025 2229 by Sunil Pride RN)  Free From Fall Injury: Instruct family/caregiver on patient safety     Problem: ABCDS Injury Assessment  Goal: Absence of physical injury  2/3/2025 2359 by Sunil Pride RN  Outcome: Progressing  2/3/2025 1010 by Heather Montelongo RN  Outcome: Progressing

## 2025-02-04 NOTE — CARE COORDINATION
Social Work/ Case Management Transition of Care Planning (Ingrid Fisher, SHARW [phone]):     Per report and chart review Pt is on 1L NC. Per attending redo amb pulse ox testing to see if home O2 is needed, yesterday it showed Pt needed 2L, order is in if needed. SW went referral to [facility], they are following if O2 is needed. PT 17/24, OT 15/24. Pt plans to discharge home with her son assisting, he will transport.  HERNANDO/CHRISTIAN to follow.  Ingrid Fisher, ALEXANDER  2/4/2025

## 2025-02-04 NOTE — CARE COORDINATION
Social Work/ Case Management Transition of Care Planning (Ingrid Fisher, [phone]):     Discharge order noted. SW called [facility] to let them know Pt needs the 2L home O2, waiting to hear what time they will deliver it. Pt to discharge home and her son will transport. HERNANDO/CHRISTIAN to follow.  Ingrid Fisher, LSW  2/4/2025    Update: [facility] to deliver tank between [id number]. MRB

## 2025-02-04 NOTE — PROGRESS NOTES
Hospitalist Progress Note      PCP: Taye Jay DO    Date of Admission: 1/28/2025        Hospital Course:    Patient has been experiencing flulike symptoms and became hypoxic prompting her to come to the emergency room for further evaluation.  On arrival patient is chest x-ray which revealed no acute findings.  Labs on arrival revealed mildly elevated proBNP of 1, 349, troponin 18-19.  Patient is currently utilizing 2 to 3 L nasal cannula to maintain adequate oxygenation.  Patient is admitted to intermediate telemetry for further workup and treatment.  She is comfortable on evaluation in no acute distress    2/1 ON ROCEPHIN AND DOXY     Subjective:  FEELING BETTER           Medications:  Reviewed    Infusion Medications    sodium chloride       Scheduled Medications    methylPREDNISolone  40 mg IntraVENous Q12H    clopidogrel  75 mg Oral Daily    ezetimibe  10 mg Oral Daily    levothyroxine  25 mcg Oral Daily    sodium chloride flush  5-40 mL IntraVENous 2 times per day    enoxaparin  40 mg SubCUTAneous Daily    ipratropium 0.5 mg-albuterol 2.5 mg  1 Dose Inhalation Q4H WA RT    cefTRIAXone (ROCEPHIN) IV  1,000 mg IntraVENous Q24H    And    doxycycline  100 mg Oral 2 times per day     PRN Meds: Polyvinyl Alcohol-Povidone PF, sodium chloride flush, sodium chloride, ondansetron **OR** ondansetron, magnesium hydroxide, acetaminophen **OR** acetaminophen      Intake/Output Summary (Last 24 hours) at 2/1/2025 1458  Last data filed at 1/31/2025 1625  Gross per 24 hour   Intake 10 ml   Output --   Net 10 ml       Exam:    /89   Pulse 87   Temp 97.7 °F (36.5 °C) (Temporal)   Resp 30   Ht 1.575 m (5' 2\")   Wt 67.5 kg (148 lb 12.8 oz)   SpO2 96%   BMI 27.22 kg/m²           Gen: WELL DEVELOPED  HEENT: NC/AT, moist mucous membranes, no oropharyngeal erythema or exudate  Neck: supple, trachea midline, no anterior cervical or SC LAD  Heart:  Normal s1/s2, RRR,  Lungs:  CTA bilaterally,  Abd: bowel 
    [facility]                                Progress note    Subjective:  Denies chest pain  States dyspnea is much improved  Requesting to be discharged     Objective:  Sitting up in bed, conversing without difficulty  No acute distress  No family present at the bedside   /66   Pulse 91   Temp 97.5 °F (36.4 °C) (Oral)   Resp 20   Ht 1.575 m (5' 2\")   Wt 69.1 kg (152 lb 4.8 oz)   SpO2 94%   BMI 27.86 kg/m²   CONST:  Well developed, well nourished frail appearing, elderly  female who appears stated age. Awake, alert, cooperative, no apparent distress  HEENT:   Head- Normocephalic, atraumatic   Eyes- Conjunctivae pink, anicteric  Throat- Oral mucosa pink and moist  Neck-  No stridor, trachea midline, no jugular venous distention. No adenopathy   CHEST: Chest symmetrical and non-tender to palpation. No accessory muscle use or intercostal retractions  RESPIRATORY: Lung sounds - diminished without wheezing  CARDIOVASCULAR:     No carotid bruit  Heart Inspection- shows no noted pulsations  Heart Palpation- no heaves or thrills; PMI is non-displaced   Heart Ausculation- Regular rate and rhythm, no murmur. No s3, s4 or rub   PV: No lower extremity edema. No varicosities. Pedal pulses palpable, no clubbing or cyanosis. Bilateral wrist braces on   ABDOMEN: Soft, non-tender to light palpation. Bowel sounds present. No palpable masses no organomegaly; no abdominal bruit  MS: Good muscle strength and tone. No atrophy or abnormal movements.   : Deferred  SKIN: Warm and dry no statis dermatitis or ulcers   NEURO / PSYCH: Oriented to person, place and time. Speech clear and appropriate. Follows all commands. Pleasant affect      Recent Labs     02/01/25  0616 02/02/25  0656 02/03/25  0509   WBC 19.0* 14.6* 11.4   HGB 12.8 12.7 12.4   HCT 38.9 39.6 37.6    331 314       Recent Labs     02/02/25  0656 02/03/25  0509 02/03/25  0822    155* 142   K 4.5 4.8 4.3    117* 107 
    [facility]                                Progress note    Subjective:  Denies chest pain  States that dyspnea is unchanged  Complains of coarse cough    Objective:  Sitting up in bed, conversing and coughing  Son present at the bedside, all questions answered   BP (!) 141/59   Pulse 77   Temp 97.1 °F (36.2 °C) (Temporal)   Resp 19   Ht 1.575 m (5' 2\")   Wt 68.1 kg (150 lb 2 oz)   SpO2 94%   BMI 27.46 kg/m²   CONST:  Well developed, well nourished frail appearing, elderly  female who appears stated age. Awake, alert, cooperative, no apparent distress  HEENT:   Head- Normocephalic, atraumatic   Eyes- Conjunctivae pink, anicteric  Throat- Oral mucosa pink and moist  Neck-  No stridor, trachea midline, no jugular venous distention. No adenopathy   CHEST: Chest symmetrical and non-tender to palpation. No accessory muscle use or intercostal retractions  RESPIRATORY: Lung sounds - diminished with expiratory wheexing throughout fields   CARDIOVASCULAR:     No carotid bruit  Heart Inspection- shows no noted pulsations  Heart Palpation- no heaves or thrills; PMI is non-displaced   Heart Ausculation- Regular rate and rhythm, no murmur. No s3, s4 or rub   PV: No lower extremity edema. No varicosities. Pedal pulses palpable, no clubbing or cyanosis. Bilateral wrist braces on   ABDOMEN: Soft, non-tender to light palpation. Bowel sounds present. No palpable masses no organomegaly; no abdominal bruit  MS: Good muscle strength and tone. No atrophy or abnormal movements.   : Deferred  SKIN: Warm and dry no statis dermatitis or ulcers   NEURO / PSYCH: Oriented to person, place and time. Speech clear and appropriate. Follows all commands. Pleasant affect      Recent Labs     01/28/25  1430 01/29/25  0906 01/30/25  0551   WBC 11.3 10.3 8.8   HGB 13.4 13.2 13.0   HCT 40.1 39.9 40.4    232 247       Recent Labs     01/28/25  1430 01/29/25  0906 01/30/25  0551    139 142   K 3.8 3.8 4.2 
    [facility]                                Progress note    Subjective:  Denies chest pain  States dyspnea is much improved  Complains of coarse cough that is improving    Objective:  Sitting up in a chair, conversing without difficulty  No acute distress  No family present at the bedside  BP (!) 136/59   Pulse 88   Temp 98.6 °F (37 °C) (Oral)   Resp 24   Ht 1.575 m (5' 2\")   Wt 67.5 kg (148 lb 12.8 oz)   SpO2 96%   BMI 27.22 kg/m²   CONST:  Well developed, well nourished frail appearing, elderly  female who appears stated age. Awake, alert, cooperative, no apparent distress  HEENT:   Head- Normocephalic, atraumatic   Eyes- Conjunctivae pink, anicteric  Throat- Oral mucosa pink and moist  Neck-  No stridor, trachea midline, no jugular venous distention. No adenopathy   CHEST: Chest symmetrical and non-tender to palpation. No accessory muscle use or intercostal retractions  RESPIRATORY: Lung sounds - diminished without wheezing  CARDIOVASCULAR:     No carotid bruit  Heart Inspection- shows no noted pulsations  Heart Palpation- no heaves or thrills; PMI is non-displaced   Heart Ausculation- Regular rate and rhythm, no murmur. No s3, s4 or rub   PV: No lower extremity edema. No varicosities. Pedal pulses palpable, no clubbing or cyanosis. Bilateral wrist braces on   ABDOMEN: Soft, non-tender to light palpation. Bowel sounds present. No palpable masses no organomegaly; no abdominal bruit  MS: Good muscle strength and tone. No atrophy or abnormal movements.   : Deferred  SKIN: Warm and dry no statis dermatitis or ulcers   NEURO / PSYCH: Oriented to person, place and time. Speech clear and appropriate. Follows all commands. Pleasant affect      Recent Labs     01/29/25  0906 01/30/25  0551 01/31/25  0732   WBC 10.3 8.8 10.0   HGB 13.2 13.0 13.2   HCT 39.9 40.4 39.2    247 300       Recent Labs     01/29/25  0906 01/30/25  0551 01/31/25  0732    142 138   K 3.8 4.2 4.4   CL 
4 Eyes Skin Assessment     NAME:  Kelly Zabala  YOB: 1929  MEDICAL RECORD NUMBER:  [id number]    The patient is being assessed for  Admission    I agree that at least one RN has performed a thorough Head to Toe Skin Assessment on the patient. ALL assessment sites listed below have been assessed.      Areas assessed by both nurses:    Head, Face, Ears, Shoulders, Back, Chest, Arms, Elbows, Hands, Sacrum. Buttock, Coccyx, Ischium, and Legs. Feet and Heels        Does the Patient have a Wound? No noted wound(s)       Ming Prevention initiated by RN: Yes  Wound Care Orders initiated by RN: No    Pressure Injury (Stage 3,4, Unstageable, DTI, NWPT, and Complex wounds) if present, place Wound referral order by RN under : No    New Ostomies, if present place, Ostomy referral order under : No     Nurse 1 eSignature: Electronically signed by Terri Healy RN on 1/29/25 at 5:29 PM EST    **SHARE this note so that the co-signing nurse can place an eSignature**    Nurse 2 eSignature: Electronically signed by Tricia Plata RN on 1/29/25 at 5:31 PM EST   
Ann AMBROCIO notified O2 testing in; states that pt is going to be observed 1 more day and will be re-evaluated tomorrow.  
CLINICAL PHARMACY NOTE: MEDS TO BEDS    Total # of Prescriptions Filled: 4   The following medications were delivered to the patient:  Prednisone 20 mg  Cefdinir 300 mg  Doxycycline hyclate 100 mg tabs  Ipratropium-albuterol 0.5-2.5 neb son    Additional Documentation:   Delivered to pt  
DVT Prophylaxis Adjustment Policy (DVT Prophylaxis)     This patient is on DVT Prophylaxis medication that requires a dose adjustment      Date Body Weight IBW  Adjusted BW SCr  CrCl Dialysis status   2/1/2025 67.5 kg (148 lb 12.8 oz) Ideal body weight: 50.1 kg (110 lb 7.2 oz)  Adjusted ideal body weight: 57.1 kg (125 lb 12.6 oz) Serum creatinine: 1.1 mg/dL (H) 02/01/25 0616  Estimated creatinine clearance: 28 mL/min (A) N/a       Pharmacy has dose-adjusted the DVT Prophylaxis regimen to match   the recommendations from the following table        Ordered Medication:Lovenox 40mg daily    Order Changed/converted to: Lovenox 30mg daily      These changes were made per protocol according to the [facility] Pharmacist   Review for Appropriate Use and Automatic Dose Adjustments of   Subcutaneous Anticoagulants Policy     *Please note this dose may need readjusted if patient's condition changes.    Please contact pharmacy with any questions regarding these changes.    Jayy Martinez RPH  2/1/2025  3:43 PM    
Discharge instructions provided and reviewed with the pt and son at bedside. Pt son verbalizes an understanding of importance of medication compliance and scheduling/attending follow up appointments. Pt son educated on how to use portable oxygen tank and verbalizes an understanding of teaching. Pt son states he has contact number for [employer] and will call them once they arrive at home and notify them that they can deliver concentrator and nebulizer. IV removed. Pt son packed up all belongings at bedside. Home medications were delivered to bedside by pharmacy and pt son has with the rest of the pt belongings.   
Nutrition Assessment     Type and Reason for Visit: LOS (RD ReScreen Negative)    Nutrition Assessment:  Pt re-screened per LOS protocol.  Chart reviewed.  Pt currently eating ~75% of most meals and w/ no other significant nutritional issues noted at this time.  Will follow-up per policy.  Please consult if RD needed.    Tommy Wilcox RD, LD  Contact: ext [phone]    
Occupational Therapy    OCCUPATIONAL THERAPY INITIAL EVALUATION    [facility]  [address], [place]        Date:2025                                                  Patient Name: Kelly Zabala    MRN: [id number]    : 1929    Room: [address] [place] [address]          Evaluating OT: Rose Stone, HARRISOND, OTR/L; [id number]      Occupational therapy physician order:   Start   Ordering Provider    25  OT eval and treat  Start:  25,   End:  25,   ONE TIME,   Standing Count:  1 Occurrences,   R         Nini Suarez, APRN - CNP          Pt presents to ED with flu like sx      Diagnosis:    1. Acute respiratory failure with hypoxia    2. 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    Stroke determined by clinical assessment ([facility])    Arachnoid cyst    Acute respiratory failure with hypoxia          Pertinent Medical History:   Past Medical History:   Diagnosis Date    Age-related osteoporosis without current pathological fracture     Carpal tunnel syndrome     Gastro-esophageal reflux disease without esophagitis     Hyperlipidemia           Surgery/Procedures: none this admission        Recommended Adaptive Equipment: TBD        Precautions:  Fall Risk, anxiety, O2     Assessment of current deficits    [x] Functional mobility  [x]ADLs  [x] Strength               [x]Cognition    [x] Functional transfers   [x] IADLs         [x] Safety Awareness   [x]Endurance    [x] Fine Coordination              [x] Balance      [] Vision/perception   []Sensation     []Gross Motor Coordination  [] ROM  [] Delirium                   [] Motor Control     OT PLAN OF CARE   OT POC based on physician orders, patient 
PULSE OX ON ROOM AIR SITTING__87__%  PULSE OX ON ___2_LITERS SITTING RECOVERY __95__%  PULSE OX ON ROOM AIR AMBULATING _86___%  PULSE OX ON __2__LITERS AMBULATING RECOVERY ___94%       Home O2 is needed   
Physical Therapy  Physical Therapy Initial Assessment     Name: Kelly Zabala  : 1929  MRN: [id number]      Date of Service: 2025    Evaluating PT:  Angella Coppola PT, DPT [id number]    Room #:  6414/6414-A  Diagnosis:  Acute respiratory failure with hypoxia [J96.01]  Pneumonia due to infectious organism, unspecified laterality, unspecified part of lung [J18.9]  PMHx/PSHx:    Past Medical History:   Diagnosis Date    Age-related osteoporosis without current pathological fracture     Carpal tunnel syndrome     Gastro-esophageal reflux disease without esophagitis     Hyperlipidemia       Procedure/Surgery:  none this admission  Precautions:  Falls, O2, [place]  Equipment Needs:  TBD, pt has FWW    SUBJECTIVE:    Pt lives with son in a 1 story home with 3 stairs to enter and 1 rail.  Bed is on 1st floor and bath is on 1st floor.  Pt ambulated with FWW PTA.    OBJECTIVE:   Initial Evaluation  Date: 25 Treatment Short Term/ Long Term   Goals   AM-PAC 6 Clicks      Was pt agreeable to Eval/treatment? yes     Does pt have pain? No c/o pain     Bed Mobility  Rolling: SBA  Supine to sit: SBA  Sit to supine: NT  Scooting: SBA  Rolling: Independent   Supine to sit: Independent   Sit to supine: Independent   Scooting: Independent    Transfers Sit to stand: [place]  Stand to sit: [place]ita  Stand pivot: [place] with WW  Sit to stand: Independent   Stand to sit: Independent   Stand pivot: Mod I with WW   Ambulation    30 feet with WW [place]  50 feet with WW Mod I    Stair negotiation: ascended and descended  NT  3 steps with 1 rail SBA   ROM BUE:  Defer to OT note  BLE:  WFL     Strength BUE:  Defer to OT note  BLE:  3+/5  WFL   Balance Sitting EOB:  SBA  Dynamic Standing:  [place] with WW  Sitting EOB:  Independent   Dynamic Standing:  Mod I with WW     Pt is A & O x 4  Sensation:  NT  Edema:  none noted      Patient education  Pt educated on role of PT, safety during mobility    Patient response to education:   Pt verbalized 
Physical Therapy  Physical Therapy Treatment    Name: Kelly Zabala  : 1929  MRN: [id number]      Date of Service: 2025    Evaluating PT:  Angella Coppola PT, DPT [id number]    Room #:  6414/6414-A  Diagnosis:  Acute respiratory failure with hypoxia [J96.01]  Pneumonia due to infectious organism, unspecified laterality, unspecified part of lung [J18.9]  PMHx/PSHx:    Past Medical History:   Diagnosis Date    Age-related osteoporosis without current pathological fracture     Carpal tunnel syndrome     Gastro-esophageal reflux disease without esophagitis     Hyperlipidemia       Procedure/Surgery:  none this admission  Precautions:  Falls, O2, [place]  Equipment Needs:  TBD, pt has FWW    SUBJECTIVE:    Pt lives with son in a 1 story home with 3 stairs to enter and 1 rail.  Bed is on 1st floor and bath is on 1st floor.  Pt ambulated with FWW PTA.    OBJECTIVE:   Initial Evaluation  Date: 25 Treatment  25 Short Term/ Long Term   Goals   AM-PAC 6 Clicks     Was pt agreeable to Eval/treatment? yes yes    Does pt have pain? No c/o pain No c/o pain    Bed Mobility  Rolling: SBA  Supine to sit: SBA  Sit to supine: NT  Scooting: SBA Rolling: SBA  Supine to sit: SBA  Sit to supine: NT  Scooting: SBA Rolling: Independent   Supine to sit: Independent   Sit to supine: Independent   Scooting: Independent    Transfers Sit to stand: [place]  Stand to sit: Sangita  Stand pivot: Sangita with WW Sit to stand: SBA  Stand to sit: SBA  Stand pivot: [place] with WW Sit to stand: Independent   Stand to sit: Independent   Stand pivot: Mod I with WW   Ambulation    30 feet with WW Sangita 30 feet x2 with WW Sangita 50 feet with WW Mod I    Stair negotiation: ascended and descended  NT  3 steps with 1 rail SBA   ROM BUE:  Defer to OT note  BLE:  WFL     Strength BUE:  Defer to OT note  BLE:  3+/5  WFL   Balance Sitting EOB:  SBA  Dynamic Standing:  Sang[place] with WW Sitting EOB:  SBA  Dynamic Standing:  Sangita with WW Sitting EOB:  
Physical Therapy  Physical Therapy Treatment    Name: Kelly Zabala  : 1929  MRN: [id number]      Date of Service: 2/3/2025    Evaluating PT:  Angella Coppola PT, DPT [id number]    Room #:  6414/6414-A  Diagnosis:  Acute respiratory failure with hypoxia [J96.01]  Pneumonia due to infectious organism, unspecified laterality, unspecified part of lung [J18.9]  PMHx/PSHx:    Past Medical History:   Diagnosis Date    Age-related osteoporosis without current pathological fracture     Carpal tunnel syndrome     Gastro-esophageal reflux disease without esophagitis     Hyperlipidemia       Procedure/Surgery:  none this admission  Precautions:  Falls, , [place]  Equipment Needs:  TBD, pt has FWW    SUBJECTIVE:    Pt lives with son in a 1 story home with 3 stairs to enter and 1 rail.  Bed is on 1st floor and bath is on 1st floor.  Pt ambulated with FWW PTA.    OBJECTIVE:   Initial Evaluation  Date: 25 Treatment  2/3/25 Short Term/ Long Term   Goals   AM-PAC 6 Clicks     Was pt agreeable to Eval/treatment? yes yes    Does pt have pain? No c/o pain No c/o pain    Bed Mobility  Rolling: SBA  Supine to sit: SBA  Sit to supine: NT  Scooting: SBA Rolling: SBA  Supine to sit: NT  Sit to supine: SBA  Scooting: SBA Rolling: Independent   Supine to sit: Independent   Sit to supine: Independent   Scooting: Independent    Transfers Sit to stand: Sangita  Stand to sit: Sangita  Stand pivot: Sangita with WW Sit to stand: SBA  Stand to sit: SBA  Stand pivot: SBA with WW Sit to stand: Independent   Stand to sit: Independent   Stand pivot: Mod I with WW   Ambulation    30 feet with WW Sangita 15 feet x2 and 50 feet with WW SBA 50 feet with WW Mod I    Stair negotiation: ascended and descended  NT NT 3 steps with 1 rail SBA   ROM BUE:  Defer to OT note  BLE:  WFL     Strength BUE:  Defer to OT note  BLE:  3+/5  WFL   Balance Sitting EOB:  SBA  Dynamic Standing:  Sangita with WW Sitting EOB:  SBA  Dynamic Standing:  SBA with WW Sitting EOB:  
Pulse ox was 90% on room air at rest.  Ambulated patient on room air.  Oxygen saturation was 85% on room air while ambulating.  Oxygen applied.  Recovery pulse ox was 90% on 2 liters of oxygen while ambulating.  
Spiritual Health History and Assessment/Progress Note  [facility] [place]    (P) Initial Encounter, Spiritual/Emotional Needs,  ,  ,      Name: Kelly Zabala MRN: [id number]    Age: 89 y.o.     Sex: female   Language: English   Yarsani: None   Acute respiratory failure with hypoxia     Date: 1/31/2025                           Spiritual Assessment began in [facility]        Referral/Consult From: (P) Rounding   Encounter Overview/Reason: (P) Initial Encounter, Spiritual/Emotional Needs  Service Provided For: (P) Patient    Carol Ann, Belief, Meaning:   Patient identifies as spiritual, is connected with a carol ann tradition or spiritual practice, has beliefs or practices that help with coping during difficult times, and Other: Mrs. Zabala talked with me about her Confucianism beliefs and how she was a member of a [employer] up until that [employer] closed.  We talked about how God has been with the during her long life.    Family/Friends No family/friends present      Importance and Influence:  Patient has spiritual/personal beliefs that influence decisions regarding their health  Family/Friends No family/friends present    Community:  Patient feels well-supported. Support system includes: Children and Other: She spoke with me about her son who lives with and cares for her.  She also has a daughter who lives in [place].   Family/Friends No family/friends present    Assessment and Plan of Care:     Patient Interventions include: Facilitated expression of thoughts and feelings, Explored spiritual coping/struggle/distress, Engaged in theological reflection, Affirmed coping skills/support systems, Facilitated life review and/ or legacy, and Other: We talked about how [place] the [place] with her .  She said that her 's grandmother had lived in the [facility].  She said she has lived in the same home for 60+ years and lost her  about 10 years ago.  Her  was a  but worked on a 
Spoke with Ann AMBROCIO while on unit who states to continue to monitor on current unit. No transfer at this time.   
CARE   OT POC based on physician orders, patient diagnosis and results of clinical assessment     Frequency/Duration 1-3 days/wk for 2 weeks PRN   Specific OT Treatment Interventions to include:   * Instruction/training on adapted ADL techniques and AE recommendations to increase functional independence within precautions       * Training on energy conservation strategies, correct breathing pattern and techniques to improve independence/tolerance for self-care routine  * Functional transfer/mobility training/DME recommendations for increased independence, safety, and fall prevention  * Patient/Family education to increase follow through with safety techniques and functional independence  * Recommendation of environmental modifications for increased safety with functional transfers/mobility and ADLs  * Therapeutic exercise to improve motor endurance, ROM, and functional strength for ADLs/functional transfers  * Therapeutic activities to facilitate/challenge dynamic balance, stand tolerance for increased safety and independence with ADLs  * Therapeutic activities to facilitate gross/fine motor skills for increased independence with ADLs  * Neuro-muscular re-education: facilitation of righting/equilibrium reactions, midline orientation, scapular stability/mobility, normalization of muscle tone, and facilitation of volitional active controled movement  * Positioning to improve skin integrity, interaction with environment and functional independence     Home Living: Pt lives with son  in a 1 story house with 3 steps to enter  and 1 hand rail    Bedroom setup: main level  standard bed   Bathroom setup: main level  walk in shower , tub/shower combination , and standard commode      Equipment owned: front wheeled walker  shower chair       Prior Level of Function:  Modified Independent  with ADLs , Assist with IADLs; using fww for functional mobility.   Driving: not currently  Occupation: n/a     Pain Level: no c/o pain   
OT  AM PAC 16/24  Discharge planning, likely tomorrow        Code status: FULL  Consultants:  None  DVT Prophylaxis Lovenox 40 mg daily  PT/OT  Discharge planning           Ann Anderson, YO - CNP,  6:47 PM  2/2/2025

## 2025-02-04 NOTE — CARE COORDINATION
2/4:  Update CM Note:  Pt ordered a Nebulizer.  CM called to [facility].  Electronically signed by Mya Ball RN on 2/4/2025 at 3:46 PM

## 2025-02-04 NOTE — DISCHARGE INSTRUCTIONS
Your information:  Name: Kelly Zabala  : 1929    Your instructions:    Take all medications as prescribed. Be sure to schedule and attend all follow up appointments.    What to do after you leave the hospital:    Recommended diet: regular diet    Recommended activity: activity as tolerated        The following personal items were collected during your admission and were returned to you:    Belongings  Dental Appliances: None  Vision - Corrective Lenses: Eyeglasses  Hearing Aid: None  Clothing: Pajamas, Slippers, Socks, Jacket/Coat, Undergarments  Jewelry: None  Electronic Devices: None  Weapons (Notify Protective Services/Security): None  Home Medications: None  Valuables Given To: Patient  Provide Name(s) of Who Valuable(s) Were Given To: NA    Information obtained by:  By signing below, I understand that if any problems occur once I leave the hospital I am to contact Dr. Jay or call 911 in case of an emergency.  I understand and acknowledge receipt of the instructions indicated above.

## 2025-02-04 NOTE — DISCHARGE SUMMARY
[facility]   Discharge summary   Patient ID:  Kelly Zabala  [id number]  89 y.o.  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 female of Taye Jay DO with significant past medical history of HLD, hypothyroidism who presents emergency room for nasal congestion and cough.  Patient has been experiencing flulike symptoms and became hypoxic prompting her to come to the emergency room for further evaluation.  On arrival patient is chest x-ray which revealed no acute findings.  Labs on arrival revealed mildly elevated proBNP of 1, 349, troponin 18-19.  Patient is currently utilizing 2 to 3 L nasal cannula to maintain adequate oxygenation.  Patient is admitted to intermediate telemetry for further workup and treatment.  She is comfortable on evaluation in no acute distress.    ASSESSMENT/PLAN:  Principal Problem:    Acute respiratory failure with hypoxia  Resolved Problems:    * No resolved hospital problems. *     Patient is a 89-year-old female admitted to intermediate for  Acute respiratory failure with hypoxia  -Monitor labs  -Elevated proBNP 1, 349  -Respiratory panel negative  -Continue Rocephin and Doxy with plans to

## 2025-02-05 ENCOUNTER — CARE COORDINATION (OUTPATIENT)
Dept: CARE COORDINATION | Age: 89
End: 2025-02-05

## 2025-02-05 NOTE — CARE COORDINATION
Care Transitions Note    Initial Call - Call within 2 business days of discharge: Yes    First attempt to reach the patient for initial Care Transition call post hospital discharge. HIPAA compliant message left with CTN's contact information requesting return phone call.      Patient: Kelly Zabala    Patient : 1929   MRN: <[id number]>    Reason for Admission: acute resp failure with hypoxia   Discharge Date: 25  RURS: Readmission Risk Score: 13.1    Last Discharge Facility       Date Complaint Diagnosis Description Type Department Provider    25 Cough; Nasal Congestion Acute respiratory failure with hypoxia ... ED to Hosp-Admission (Discharged) (ADMITTED) [facility] Dena Brewer MD; Sarah Bernstein,...     Follow Up Appointment:   Patient does not have a follow up appointment scheduled at time of call.  Will route a message to Dr. Jay's office regarding scheduling.  Future Appointments         Provider Specialty Dept Phone    2025 12:00 PM Taye Jay, DO Internal Medicine [phone]          Lety Kwon RN

## 2025-02-06 ENCOUNTER — CARE COORDINATION (OUTPATIENT)
Dept: CARE COORDINATION | Age: 89
End: 2025-02-06

## 2025-02-06 LAB
EKG ATRIAL RATE: 72 BPM
EKG P AXIS: 89 DEGREES
EKG P-R INTERVAL: 110 MS
EKG Q-T INTERVAL: 386 MS
EKG QRS DURATION: 80 MS
EKG QTC CALCULATION (BAZETT): 422 MS
EKG R AXIS: 85 DEGREES
EKG T AXIS: 113 DEGREES
EKG VENTRICULAR RATE: 72 BPM

## 2025-02-06 NOTE — CARE COORDINATION
Care Transitions Note    Initial Call - Call within 2 business days of discharge: Yes    Second and final attempt to reach the patient for initial affiliate provider Care Transition call post hospital discharge. HIPAA compliant message left with CTN's contact information requesting return phone call.   If no return call by the end of today, CTN will sign off and resolve CT program.     Patient: Kelly Zabala    Patient : 1929   MRN: [id number]    Reason for Admission: acute resp failure   Discharge Date: 25  RURS: Readmission Risk Score: 13.1    Last Discharge Facility       Date Complaint Diagnosis Description Type Department Provider    25 Cough; Nasal Congestion Acute respiratory failure with hypoxia ... ED to Hosp-Admission (Discharged) (ADMITTED) [facility] [place] [facility] Dena Brewer MD; Sarah Bernstein,...     Follow Up Appointment:   Message routed to Dr. Jay's office 25 regarding scheduling.   Future Appointments         Provider Specialty Dept Phone    2025 12:00 PM Taye Jay, DO Internal Medicine [phone]        Lety Kwon RN